# Patient Record
Sex: FEMALE | Race: WHITE | NOT HISPANIC OR LATINO | Employment: UNEMPLOYED | URBAN - METROPOLITAN AREA
[De-identification: names, ages, dates, MRNs, and addresses within clinical notes are randomized per-mention and may not be internally consistent; named-entity substitution may affect disease eponyms.]

---

## 2018-09-07 ENCOUNTER — HOSPITAL ENCOUNTER (INPATIENT)
Facility: CLINIC | Age: 33
LOS: 2 days | Discharge: LEFT BEFORE DISCHARGE | End: 2018-09-10
Attending: FAMILY MEDICINE | Admitting: PSYCHIATRY & NEUROLOGY
Payer: MEDICAID

## 2018-09-07 DIAGNOSIS — F19.10 POLYSUBSTANCE ABUSE (H): ICD-10-CM

## 2018-09-07 DIAGNOSIS — F11.20 HEROIN DEPENDENCE (H): ICD-10-CM

## 2018-09-07 LAB
AMPHETAMINES UR QL SCN: POSITIVE
BARBITURATES UR QL: NEGATIVE
BENZODIAZ UR QL: POSITIVE
CANNABINOIDS UR QL SCN: NEGATIVE
COCAINE UR QL: NEGATIVE
ETHANOL UR QL SCN: NEGATIVE
HCG UR QL: NEGATIVE
INTERNAL QC OK POCT: YES
OPIATES UR QL SCN: POSITIVE

## 2018-09-07 PROCEDURE — 99284 EMERGENCY DEPT VISIT MOD MDM: CPT | Mod: Z6 | Performed by: FAMILY MEDICINE

## 2018-09-07 PROCEDURE — 99285 EMERGENCY DEPT VISIT HI MDM: CPT | Performed by: FAMILY MEDICINE

## 2018-09-07 PROCEDURE — 80320 DRUG SCREEN QUANTALCOHOLS: CPT | Performed by: FAMILY MEDICINE

## 2018-09-07 PROCEDURE — 81025 URINE PREGNANCY TEST: CPT | Performed by: FAMILY MEDICINE

## 2018-09-07 PROCEDURE — 80307 DRUG TEST PRSMV CHEM ANLYZR: CPT | Performed by: FAMILY MEDICINE

## 2018-09-07 ASSESSMENT — ENCOUNTER SYMPTOMS
DYSURIA: 0
WEAKNESS: 0
COUGH: 0
FEVER: 0
NUMBNESS: 0
HEADACHES: 0
ABDOMINAL PAIN: 0
HEMATURIA: 0
FREQUENCY: 0
CHILLS: 0
SHORTNESS OF BREATH: 0
NERVOUS/ANXIOUS: 1

## 2018-09-07 NOTE — IP AVS SNAPSHOT
MRN:[id number]                      After Visit Summary   9/7/2018    Alexandrea Linton    MRN: [id number]           Patient Information     Date Of Birth          1985        Designated Caregiver       Most Recent Value    Caregiver    Will someone help with your care after discharge? no      About your hospital stay     You were admitted on:  September 8, 2018 You last received care in the:  [facility]    You were discharged on:  September 10, 2018       Who to Call     For medical emergencies, please call 911.  For non-urgent questions about your medical care, please call your primary care provider or clinic, None          Attending Provider     Provider Specialty    Hussain Alvares MD Emergency Medicine    Anuradha Mar MD Psychiatry       Primary Care Provider Fax #    Physician No Ref-Primary [phone]      Further instructions from your care team       Behavioral Discharge Planning and Instructions  THANK YOU FOR CHOOSING THE [address]  [phone]    Summary: You were admitted to Station 3A on 9/7/2018 for detoxification from opiates and benzodiazepines.  A medical exam was performed that included lab work. You have met with a  and opted to discharge against medical advice.  Please take care and make your recovery a daily priority, Alexandrea! It was a pleasure working with you and the entire treatment team on 3A wishes you the very best in your recovery!     Main Diagnoses:  Per Dr. Amaro;  1.  Opiate use disorder, severe.   2.  Stimulant use disorder, amphetamine-type substance, moderate.   3.  Sedative hypnotic use disorder, moderate.   4.  Unspecified anxiety disorder.   5.  History of major depressive disorder.     Major Treatments, Procedures and Findings:  You were treated for opiate and benzodiazepine detoxification using the appropriate protocol(s). You had labs drawn and those results were  reviewed with you. Please take a copy of your lab work with you to your next primary care physician appointment.    Symptoms to Report:  If you experience more anxiety, confusion, sleeplessness, deep sadness or thoughts of suicide, notify your treatment team or notify your primary care physician. IF ANY OF THE SYMPTOMS YOU ARE EXPERIENCING ARE A MEDICAL EMERGENCY CALL 911 IMMEDIATELY.     Lifestyle Adjustment: Adjust your lifestyle to get enough sleep, relaxation, exercise and good nutrition. Continue to develop healthy coping skills to decrease stress and promote a sober living environment. Do not use mood altering substances including alcohol, illegal drugs or addictive medications other than what is currently prescribed.     Disposition: Home or Self Care    Follow-up Appointment:   ~ Please make a follow-up appointment with primary care provider within 1 week of discharge ~    Resources:   AA/JOSE C and Sponsors are excellent resources for support and you can find one at any support group meeting.   SMART Recovery - self management for addiction recovery:  www.smartrecovery.org  http://www.aastpaul.org/?topic=8  http://a[place].org/meetings/  http://www.nam[place].org/index.php/meeting-list-pdf  Pathways ~ A Health Crisis Resource & Support Center:  112.337.4668.  http://www.harmreduction.org  [facility] 491-301-0359  Support Group:  AA/JOSE C and Sponsor/support.  National [place] on Mental Illness (www.[place].org): 379.303.8868 or 454-067-2066.  Alcoholics Anonymous (www.alcoholics-anonymous.org): Check your phone book for your local chapter.  Suicide Awareness Voices of Education (SAVE) (www.save.org): 022-076-IEJI (0283)  National Suicide Prevention Line (www.mentalhealthmn.org): 641-324-SXVW (9668)  Mental Health Consumer/Survivor Network of [place] (www.mhcsn.net): 557.309.7405 or 224-290-9392  Mental Health Association of [place] (www.mentalhealth.org): 227.494.8595 or 338-780-0512   Substance Abuse  and Mental Health Services (www.[facility].gov)    [facility] ([facility])  [facility] connects people seeking recovery to resources that help foster and sustain long-term recovery.  Whether you are seeking resources for treatment, transportation, housing, job training, education, health care or other pathways to recovery, [facility] is a great place to start.  670.503.4857.  www.[facility].org    General Medication Instructions:   See your medication sheet(s) for instructions.   Take all medications as prescribed.  Make no changes unless your doctor suggests them.   Go to all your doctor visits.  Be sure to have all your required lab tests. This way, your medicines can be refilled on time.  Do not use any forms of alcohol.    Please Note:  If you have any questions at anytime after you are discharged please call the [facility] [place] detox unit 3AW unit at [phone].  [facility] Behavioral Intake [phone]  Medical Records call [phone]  Outpatient Behavioral Intake call [phone]  LP+ Wait List/Bed Availability call [phone]    Please take this discharge folder with you to all your follow up appointments, it contains your lab results, diagnosis, medication list and discharge recommendations.      THANK YOU FOR CHOOSING THE [facility]     Pending Results     No orders found from 9/5/2018 to 9/8/2018.            Statement of Approval     Ordered          09/10/18 1034  I have reviewed and agree with all the recommendations and orders detailed in this document.  EFFECTIVE NOW     Approved and electronically signed by:  Anuradha Mar MD             Admission Information     Date & Time Provider Department Dept. Phone    9/7/2018 Anuradha Mar MD Young Adult [facility]patient Mental Health [phone]      Your Vitals Were     Blood Pressure Pulse Temperature Respirations Height Weight    120/82 (BP Location: Right arm) 92  "97.2  F (36.2  C) (Oral) 16 1.613 m (5' 3.5\") 77.1 kg (170 lb)    Pulse Oximetry BMI (Body Mass Index)                98% 29.64 kg/m2          [employer] Information     [employer] lets you send messages to your doctor, view your test results, renew your prescriptions, schedule appointments and more. To sign up, go to [email or web address] . Click on \"Log in\" on the left side of the screen, which will take you to the Welcome page. Then click on \"Sign up Now\" on the right side of the page.     You will be asked to enter the access code listed below, as well as some personal information. Please follow the directions to create your username and password.     Your access code is: [email or web address]  Expires: 2018 10:51 AM     Your access code will  in 90 days. If you need help or a new code, please call your [place] clinic or [phone].        Care EveryWhere ID     This is your Care EveryWhere ID. This could be used by other organizations to access your [place] medical records  [id number]        Equal Access to Services     MAR MICHEL AH: Nathanael Rogers, damir ribeiro, joann pandey, abdon anthony. So [facility] [phone].    ATENCIÓN: Si habla español, tiene a reynolds disposición servicios gratuitos de asistencia lingüística. Kelvin al [phone].    We comply with applicable federal civil rights laws and [place] laws. We do not discriminate on the basis of race, color, national origin, age, disability, sex, sexual orientation, or gender identity.               Review of your medicines      START taking        Dose / Directions    PHENobarbital 32.4 MG Tabs tablet   Commonly known as:  LUMINAL   Used for:  Polysubstance abuse        Dose:  32.4 mg   Take 1 tablet (32.4 mg) by mouth At Bedtime On  take 2 tabs at bedtime x1day  On  take 1 tabs at bedtime x1day   Quantity:  3 tablet   Refills:  0         CONTINUE these medicines which have NOT CHANGED  "       Dose / Directions    NO ACTIVE MEDICATIONS        Refills:  0            Where to get your medicines      Some of these will need a paper prescription and others can be bought over the counter. Ask your nurse if you have questions.     Bring a paper prescription for each of these medications     PHENobarbital 32.4 MG Tabs tablet                Protect others around you: Learn how to safely use, store and throw away your medicines at www.disposemymeds.org.             Medication List: This is a list of all your medications and when to take them. Check marks below indicate your daily home schedule. Keep this list as a reference.      Medications           Morning Afternoon Evening Bedtime As Needed    NO ACTIVE MEDICATIONS                                PHENobarbital 32.4 MG Tabs tablet   Commonly known as:  LUMINAL   Take 1 tablet (32.4 mg) by mouth At Bedtime On 9/11 take 2 tabs at bedtime x1day  On 9/12 take 1 tabs at bedtime x1day   Last time this was given:  32.4 mg on 9/10/2018  7:55 AM

## 2018-09-07 NOTE — IP AVS SNAPSHOT
Young Adult [facility] Mental [facility]    [facility] [address]AW    [address] [address]    Phone:  [phone]                                       After Visit Summary   9/7/2018    Alexandrea Linton    MRN: [id number]           After Visit Summary Signature Page     I have received my discharge instructions, and my questions have been answered. I have discussed any challenges I see with this plan with the nurse or doctor.    ..........................................................................................................................................  Patient/Patient Representative Signature      ..........................................................................................................................................  Patient Representative Print Name and Relationship to Patient    ..................................................               ................................................  Date                                            Time    ..........................................................................................................................................  Reviewed by Signature/Title    ...................................................              ..............................................  Date                                                            Time          22EPIC Rev 08/18

## 2018-09-08 PROBLEM — F11.20 HEROIN DEPENDENCE (H): Status: ACTIVE | Noted: 2018-09-08

## 2018-09-08 LAB
ALBUMIN SERPL-MCNC: 2.9 G/DL (ref 3.4–5)
ALP SERPL-CCNC: 95 U/L (ref 40–150)
ALT SERPL W P-5'-P-CCNC: 28 U/L (ref 0–50)
ANION GAP SERPL CALCULATED.3IONS-SCNC: 6 MMOL/L (ref 3–14)
AST SERPL W P-5'-P-CCNC: 15 U/L (ref 0–45)
BILIRUB SERPL-MCNC: 0.3 MG/DL (ref 0.2–1.3)
BUN SERPL-MCNC: 9 MG/DL (ref 7–30)
CALCIUM SERPL-MCNC: 8.4 MG/DL (ref 8.5–10.1)
CHLORIDE SERPL-SCNC: 108 MMOL/L (ref 94–109)
CO2 SERPL-SCNC: 27 MMOL/L (ref 20–32)
CREAT SERPL-MCNC: 0.89 MG/DL (ref 0.52–1.04)
ERYTHROCYTE [DISTWIDTH] IN BLOOD BY AUTOMATED COUNT: 12.8 % (ref 10–15)
GFR SERPL CREATININE-BSD FRML MDRD: 73 ML/MIN/1.7M2
GLUCOSE SERPL-MCNC: 97 MG/DL (ref 70–99)
HCT VFR BLD AUTO: 35.2 % (ref 35–47)
HGB BLD-MCNC: 11.6 G/DL (ref 11.7–15.7)
MCH RBC QN AUTO: 28.2 PG (ref 26.5–33)
MCHC RBC AUTO-ENTMCNC: 33 G/DL (ref 31.5–36.5)
MCV RBC AUTO: 85 FL (ref 78–100)
PLATELET # BLD AUTO: 230 10E9/L (ref 150–450)
POTASSIUM SERPL-SCNC: 4.2 MMOL/L (ref 3.4–5.3)
PROT SERPL-MCNC: 6.5 G/DL (ref 6.8–8.8)
RBC # BLD AUTO: 4.12 10E12/L (ref 3.8–5.2)
SODIUM SERPL-SCNC: 141 MMOL/L (ref 133–144)
TSH SERPL DL<=0.005 MIU/L-ACNC: 1.93 MU/L (ref 0.4–4)
WBC # BLD AUTO: 4.6 10E9/L (ref 4–11)

## 2018-09-08 PROCEDURE — 99221 1ST HOSP IP/OBS SF/LOW 40: CPT | Performed by: PHYSICIAN ASSISTANT

## 2018-09-08 PROCEDURE — HZ2ZZZZ DETOXIFICATION SERVICES FOR SUBSTANCE ABUSE TREATMENT: ICD-10-PCS | Performed by: PSYCHIATRY & NEUROLOGY

## 2018-09-08 PROCEDURE — 84443 ASSAY THYROID STIM HORMONE: CPT | Performed by: PHYSICIAN ASSISTANT

## 2018-09-08 PROCEDURE — 12800013 ZZH R&B CD INTERMEDIATE ADOLESCENT

## 2018-09-08 PROCEDURE — 99207 ZZC CONSULT E&M CHANGED TO INITIAL LEVEL: CPT | Performed by: PHYSICIAN ASSISTANT

## 2018-09-08 PROCEDURE — 25000125 ZZHC RX 250: Performed by: PHYSICIAN ASSISTANT

## 2018-09-08 PROCEDURE — 25000132 ZZH RX MED GY IP 250 OP 250 PS 637: Performed by: PHYSICIAN ASSISTANT

## 2018-09-08 PROCEDURE — 85027 COMPLETE CBC AUTOMATED: CPT | Performed by: PHYSICIAN ASSISTANT

## 2018-09-08 PROCEDURE — 99223 1ST HOSP IP/OBS HIGH 75: CPT | Mod: AI | Performed by: PSYCHIATRY & NEUROLOGY

## 2018-09-08 PROCEDURE — 25000132 ZZH RX MED GY IP 250 OP 250 PS 637: Performed by: FAMILY MEDICINE

## 2018-09-08 PROCEDURE — 80053 COMPREHEN METABOLIC PANEL: CPT | Performed by: PHYSICIAN ASSISTANT

## 2018-09-08 PROCEDURE — 36415 COLL VENOUS BLD VENIPUNCTURE: CPT | Performed by: PHYSICIAN ASSISTANT

## 2018-09-08 PROCEDURE — 25000132 ZZH RX MED GY IP 250 OP 250 PS 637: Performed by: PSYCHIATRY & NEUROLOGY

## 2018-09-08 RX ORDER — ACETAMINOPHEN 325 MG/1
650 TABLET ORAL EVERY 4 HOURS PRN
Status: DISCONTINUED | OUTPATIENT
Start: 2018-09-08 | End: 2018-09-10 | Stop reason: HOSPADM

## 2018-09-08 RX ORDER — NALOXONE HYDROCHLORIDE 0.4 MG/ML
.1-.4 INJECTION, SOLUTION INTRAMUSCULAR; INTRAVENOUS; SUBCUTANEOUS
Status: DISCONTINUED | OUTPATIENT
Start: 2018-09-08 | End: 2018-09-10 | Stop reason: HOSPADM

## 2018-09-08 RX ORDER — NICOTINE 21 MG/24HR
1 PATCH, TRANSDERMAL 24 HOURS TRANSDERMAL DAILY
Status: DISCONTINUED | OUTPATIENT
Start: 2018-09-08 | End: 2018-09-10 | Stop reason: HOSPADM

## 2018-09-08 RX ORDER — LIDOCAINE 4 G/G
1 PATCH TOPICAL
Status: DISCONTINUED | OUTPATIENT
Start: 2018-09-08 | End: 2018-09-10 | Stop reason: HOSPADM

## 2018-09-08 RX ORDER — BUPRENORPHINE 2 MG/1
2 TABLET SUBLINGUAL 4 TIMES DAILY
Status: DISCONTINUED | OUTPATIENT
Start: 2018-09-08 | End: 2018-09-10

## 2018-09-08 RX ORDER — CYCLOBENZAPRINE HCL 10 MG
10 TABLET ORAL 3 TIMES DAILY PRN
Status: DISCONTINUED | OUTPATIENT
Start: 2018-09-08 | End: 2018-09-10 | Stop reason: HOSPADM

## 2018-09-08 RX ORDER — HYDROXYZINE HYDROCHLORIDE 25 MG/1
25 TABLET, FILM COATED ORAL EVERY 4 HOURS PRN
Status: DISCONTINUED | OUTPATIENT
Start: 2018-09-08 | End: 2018-09-08

## 2018-09-08 RX ORDER — IBUPROFEN 600 MG/1
600 TABLET, FILM COATED ORAL EVERY 6 HOURS PRN
Status: DISCONTINUED | OUTPATIENT
Start: 2018-09-08 | End: 2018-09-10 | Stop reason: HOSPADM

## 2018-09-08 RX ORDER — GINSENG 100 MG
CAPSULE ORAL 2 TIMES DAILY
Status: DISCONTINUED | OUTPATIENT
Start: 2018-09-08 | End: 2018-09-10 | Stop reason: HOSPADM

## 2018-09-08 RX ORDER — PHENOBARBITAL 32.4 MG/1
32.4 TABLET ORAL 3 TIMES DAILY
Status: DISCONTINUED | OUTPATIENT
Start: 2018-09-08 | End: 2018-09-08

## 2018-09-08 RX ORDER — PHENOBARBITAL 32.4 MG/1
32.4 TABLET ORAL 3 TIMES DAILY
Status: DISCONTINUED | OUTPATIENT
Start: 2018-09-08 | End: 2018-09-10

## 2018-09-08 RX ORDER — ONDANSETRON 4 MG/1
4 TABLET, FILM COATED ORAL EVERY 6 HOURS PRN
Status: DISCONTINUED | OUTPATIENT
Start: 2018-09-08 | End: 2018-09-10 | Stop reason: HOSPADM

## 2018-09-08 RX ORDER — HYDROXYZINE HYDROCHLORIDE 25 MG/1
25-50 TABLET, FILM COATED ORAL EVERY 4 HOURS PRN
Status: DISCONTINUED | OUTPATIENT
Start: 2018-09-08 | End: 2018-09-10 | Stop reason: HOSPADM

## 2018-09-08 RX ORDER — TRAZODONE HYDROCHLORIDE 50 MG/1
50 TABLET, FILM COATED ORAL
Status: DISCONTINUED | OUTPATIENT
Start: 2018-09-08 | End: 2018-09-10 | Stop reason: HOSPADM

## 2018-09-08 RX ORDER — BUPRENORPHINE 2 MG/1
4 TABLET SUBLINGUAL ONCE
Status: COMPLETED | OUTPATIENT
Start: 2018-09-08 | End: 2018-09-08

## 2018-09-08 RX ADMIN — PHENOBARBITAL 32.4 MG: 32.4 TABLET ORAL at 02:51

## 2018-09-08 RX ADMIN — IBUPROFEN 600 MG: 600 TABLET, FILM COATED ORAL at 20:40

## 2018-09-08 RX ADMIN — BACITRACIN: 500 OINTMENT TOPICAL at 21:12

## 2018-09-08 RX ADMIN — BUPRENORPHINE HCL 2 MG: 2 TABLET SUBLINGUAL at 20:39

## 2018-09-08 RX ADMIN — BUPRENORPHINE HCL 4 MG: 2 TABLET SUBLINGUAL at 22:13

## 2018-09-08 RX ADMIN — PHENOBARBITAL 32.4 MG: 32.4 TABLET ORAL at 09:02

## 2018-09-08 RX ADMIN — HYDROXYZINE HYDROCHLORIDE 25 MG: 25 TABLET ORAL at 09:02

## 2018-09-08 RX ADMIN — IBUPROFEN 600 MG: 600 TABLET, FILM COATED ORAL at 02:51

## 2018-09-08 RX ADMIN — NICOTINE 1 PATCH: 14 PATCH, EXTENDED RELEASE TRANSDERMAL at 10:53

## 2018-09-08 RX ADMIN — LIDOCAINE 1 PATCH: 560 PATCH PERCUTANEOUS; TOPICAL; TRANSDERMAL at 10:54

## 2018-09-08 RX ADMIN — BACITRACIN: 500 OINTMENT TOPICAL at 10:53

## 2018-09-08 RX ADMIN — IBUPROFEN 600 MG: 600 TABLET, FILM COATED ORAL at 09:02

## 2018-09-08 RX ADMIN — HYDROXYZINE HYDROCHLORIDE 50 MG: 25 TABLET ORAL at 23:16

## 2018-09-08 RX ADMIN — CYCLOBENZAPRINE HYDROCHLORIDE 10 MG: 10 TABLET, FILM COATED ORAL at 23:16

## 2018-09-08 RX ADMIN — CYCLOBENZAPRINE HYDROCHLORIDE 10 MG: 10 TABLET, FILM COATED ORAL at 14:00

## 2018-09-08 RX ADMIN — PHENOBARBITAL 32.4 MG: 32.4 TABLET ORAL at 14:00

## 2018-09-08 RX ADMIN — BUPRENORPHINE HCL 2 MG: 2 TABLET SUBLINGUAL at 17:03

## 2018-09-08 RX ADMIN — PHENOBARBITAL 32.4 MG: 32.4 TABLET ORAL at 20:40

## 2018-09-08 ASSESSMENT — ACTIVITIES OF DAILY LIVING (ADL)
GROOMING: INDEPENDENT
ORAL_HYGIENE: INDEPENDENT
LAUNDRY: WITH SUPERVISION
DRESS: INDEPENDENT
LAUNDRY: WITH SUPERVISION
GROOMING: INDEPENDENT
ORAL_HYGIENE: INDEPENDENT
DRESS: INDEPENDENT

## 2018-09-08 NOTE — PROGRESS NOTES
09/08/18 0418   Patient Belongings   Did you bring any home meds/supplements to the hospital?  Yes   Disposition of meds  Sent to security/pharmacy per site process   Patient Belongings purse;cell phone/electronics;wallet   Disposition of Belongings Other (see comment);Kept with patient;Locker;Sent to security per site process   Belongings Search Yes   Clothing Search Yes   Second Staff Keke BAJWA           Stored in Bin- Samsu cell phone, [employer] Tablet, Red purse, white underwear and bra, zip hoodie w/ strings, black shoes w/ laces, flip flops, 2x coloring books, word find book, mirror, spray on deodorant, 4x tampons, hairbrush, , cleaning wipes, 4 large hair bands, coloring binder w/ notepad and coloring sheets, approx 70 markers in black marker bag, make up bag, lipstick, blush, Origel, lip balm, gloss, mascara, lip plumping, charging cord, wipes, cleansing wipes, unicorn make up pallet, 2x chap stick, 4x lighters, headphones (ear buds), keys, perfume, tweezers, tiny gold spoon, pen, josé miguel cream, silver colored necklace, no cash, assorted coupons, silver colored wallet, NO CASH, 31 cents.  On Person- Black leggings, hoodie with string removed, 2x tank tops, underwear.   Sent to security contraband- Foldable knife, 2x sharp dental pick, small screwdriver, small cuticle cutter, multitool w/ sharp edges, metal ellen/monical w/ unkown substance.  Sent to security valuables, envelope # [id number]- [place], [place] bank debit card ---- [phone], [employer] ID, [place] EBT card ----[phone], [employer] card [phone], [employer] secret pink card ---- [phone]  Sent to pharmacy envelope # [id number]- Aleve 4x pill.           A               Admission:  I am responsible for any personal items that are not sent to the safe or pharmacy.  [facility] is not responsible for loss, theft or damage of any property in my possession.    Signature:  _________________________________ Date: _______  Time: _____                                               Staff Signature:  ____________________________ Date: ________  Time: _____      2nd Staff person, if patient is unable/unwilling to sign:    Signature: ________________________________ Date: ________  Time: _____     Discharge:  [facility] has returned all of my personal belongings:    Signature: _________________________________ Date: ________  Time: _____                                          Staff Signature:  ____________________________ Date: ________  Time: _____

## 2018-09-08 NOTE — H&P
"Admitted:     09/07/2018      IDENTIFYING INFORMATION:  The patient is a 32-year-old female who is currently unemployed and resides with her fiance.      CHIEF COMPLAINT:  \"Things haven't been going well.\"      HISTORY OF PRESENT ILLNESS:  The patient has a history of opiate dependence, who presented voluntarily to the Emergency Room seeking detox.  Her urine drug screen was positive for amphetamines, opiates and benzodiazepines with her urine drug screen negative on examination today.  She recalls her last admission to our detox in 2009, after which she transitioned to residential treatment and Suboxone maintenance.  She mostly maintained sobriety for the subsequent 4 years with the exception of a brief relapse approximately 1 year ago.  She relapsed on opiates and has progressively increased her use over the past 1 year.  She estimates using nearly half a gram of heroin, which she injects intravenously on a daily basis.  She also reports intermittent usage of methamphetamine, estimating 1 to 2 times a week and alcohol approximately once a week.  She has also been using benzodiazepines on a daily basis, estimating either 2 mg of Xanax or Klonopin daily.  Additional complicating factors involve chronic back pain and severe anxiety, and she reports gaining relief of these symptoms by using these substances.  Her ongoing usage has resulted in some strain in her relationship with her fiance and loss of her job a few months ago.  Today, she is hopeful to detox from substances and regain sobriety to further improve her quality of life and comfort.      PSYCHIATRIC REVIEW OF SYSTEMS:  She endorsed depressed mood in the setting of substance usage and negative consequences.  She denied feeling helpless or hopeless.  She denied suicidal and homicidal thoughts.  She denied anhedonia.  Energy, appetite and concentration have been variable in the setting of substance usage.  No clear symptoms of graciela or psychosis reported.  " "She describes severe anxiety to the point of frequent panic attacks and some hesitation to leave her home out of fear of having another panic attack.  Despite reporting these symptoms, she has been able to maintain employment and engage in other activities outside the home.  No symptoms corresponding to SAMIA.  No symptoms corresponding to PTSD, eating disorder or OCD.      PAST PSYCHIATRIC HISTORY:  The patient recalls seeking treatment of anxiety on an outpatient basis.  She has been intermittently prescribed benzodiazepines by various providers; however, she recently lost her health insurance and has been acquiring these substances on her own.  She recalls 2 prior suicide attempts, identifying her first attempt many years ago in the context of an argument with her mother whom she states told her \"Why don't you go kill yourself?\"  In the midst of that argument, she had ingested a handful of pills resulting in evaluation in the Emergency Room and a 72-hour stay at an outside behavioral health unit.  No other inpatient hospitalizations reported.  Her second suicide attempt was approximately 9 months ago, at which point she overdosed on heroin with intent to die.  She woke up the following day and did not seek help afterwards.  She currently does not have any outpatient mental health providers.      SUBSTANCE ABUSE HISTORY:  Drug of choice is heroin with pattern of usage corresponding to dependence, identifying progressive use, loss of control over usage, tolerance, withdrawal, negative consequences including strained relationships and loss of employment, and mood instability.  She has also been regularly using benzodiazepines, estimating 2 mg of Xanax or Klonopin daily, and further notices withdrawal when she does not have the substance.  Symptoms of withdrawal are described as worsening and severe anxiety and shakes.  No history of seizures or DTs.  Alcohol usage is sporadic with no reports of tolerance or " withdrawal.  She has been to detox and treatment in the past.      PAST MEDICAL HISTORY:  Refer to the Internal Medicine consultation.      FAMILY HISTORY:  Positive for depression, anxiety and chemical addiction.  No knowledge of suicides in the family.      SOCIAL HISTORY:  Refer to the psychosocial assessment completed by our .  The patient is currently unemployed and resides with her fiance.  She reports that they have a good relationship and he is supportive of her sobriety.  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      MEDICAL REVIEW OF SYSTEMS:  Ten-point systems were reviewed and were positive for psychiatric symptoms as noted above, otherwise negative.      PHYSICAL EXAMINATION:  Blood pressure 108/74, respirations 16, heart rate 75, temperature 97.1.  Weight is 170 pounds.  The rest of the physical examination was reviewed in the Emergency Room documentation.        MENTAL STATUS EXAMINATION:  The patient appears her stated age, appropriately dressed, somewhat disheveled, unkempt, lying in bed.  She was crying, however, able to maintain composure.  No psychomotor abnormalities.  Speech was normal, not pushed or pressured.  Mood was described as being sad and anxious.  Affect was congruent.  Thought process was linear and logical.  Thought content did not display evidence of psychosis.  She denied suicidal and homicidal thoughts.  Insight and judgment appear fair.  Cognition appeared intact to interviewing including orientation to person, place, time and situation, use of language, fund of knowledge, recent and remote memory.  Muscle strength, tone and gait appear normal on visual inspection.      ASSESSMENT:   1.  Opiate use disorder, severe.   2.  Stimulant use disorder, amphetamine-type  substance, moderate.   3.  Sedative hypnotic use disorder, moderate.   4.  Unspecified anxiety disorder.   5.  History of major depressive disorder.      PLAN:   1.  The patient has been admitted to station 4A to detox from opiates and benzodiazepines.  Treatment will be continued voluntarily and her primary team will resume her care on Monday morning.   2.  Buprenorphine will be started today to minimize opiate withdrawal symptoms.  Phenobarbital has also been started for managing potential benzodiazepine withdrawal.  As she progresses through detox, her mood and anxiety symptoms can be reevaluated to determine if she would benefit from psychotropic medications to better address these symptoms.   3.  Internal Medicine consultation to address acute/chronic medical conditions.   4.  Psychosocial treatments to be addressed with social work consult and groups.  The patient is interested in exploring additional chemical addiction treatment options after she completes detoxification.  She would also benefit from a referral for individual psychotherapy; however, funding and insurance may be a barrier.  She will work with our social worked on available options.         LAMONT EMERY MD             D: 2018   T: 2018   MT: LUCRETIA      Name:     ALEXSANDRA CALLAWAY   MRN:      [id number]        Account:      [id number]   :      1985        Admitted:     2018                   Document: [id number]

## 2018-09-08 NOTE — ED NOTES
ED to Behavioral Floor Handoff    SITUATION  Alexandrea Linton is a 32 year old female who speaks English and lives in a home with others The patient arrived in the ED by private car from home with a complaint of Addiction Problem (Detox from IV heroin, last use 6 hours ago, using meth and benzos, has a bed on hold on 3A.)  .The patient's current symptoms started/worsened 1 year(s) ago and during this time the symptoms have increased.   In the ED, pt was diagnosed with   Final diagnoses:   Heroin dependence (H)   Polysubstance abuse        Initial vitals were: BP: 118/52  Pulse: 87  Heart Rate: 87  Temp: 97.7  F (36.5  C)  Resp: 16  Weight: 77.1 kg (170 lb)  SpO2: 95 %   --------  Is the patient diabetic? No   If yes, last blood glucose? --     If yes, was this treated in the ED? --  --------  Is the patient inebriated (ETOH) No or Impaired on other substances? No  MSSA done? N/A  Last MSSA score: --    Were withdrawal symptoms treated? N/A  Does the patient have a seizure history? No. If yes, date of most recent seizure--  --------  Is the patient patient experiencing suicidal ideation? denies current or recent suicidal ideation     Homicidal ideation? denies current or recent homicidal ideation or behaviors.    Self-injurious behavior/urges? denies current or recent self injurious behavior or ideation.  ------  Was pt aggressive in the ED No  Was a code called No  Is the pt now cooperative? Yes  -------  Meds given in ED: Medications - No data to display   Family present during ED course? Yes  Family currently present? Yes    BACKGROUND  Does the patient have a cognitive impairment or developmental disability? No  Allergies:   Allergies   Allergen Reactions     Latex      Rash   .   Social demographics are   Social History     Social History     Marital status:      Spouse name: N/A     Number of children: 0     Years of education: 14     Social History Main Topics     Smoking status: Current Every Day  Smoker     Smokeless tobacco: None      Comment: 1/4 PPD     Alcohol use 0.0 oz/week     0 Standard drinks or equivalent per week      Comment: few times per month     Drug use: No     Sexual activity: No     Other Topics Concern     Caffeine Concern Yes     3 cans daily     Exercise Yes     2/week     Seat Belt Yes     Social History Narrative        ASSESSMENT  Labs results Labs Ordered and Resulted from Time of ED Arrival Up to the Time of Departure from the ED - No data to display   Imaging Studies: No results found for this or any previous visit (from the past 24 hour(s)).   Most recent vital signs /52  Pulse 87  Temp 97.7  F (36.5  C) (Oral)  Resp 16  Wt 77.1 kg (170 lb)  SpO2 95%   Abnormal labs/tests/findings requiring intervention:---   Pain control: pt had none  Nausea control: pt had none    RECOMMENDATION  Are any infection precautions needed (MRSA, VRE, etc.)? No If yes, what infection? --  ---  Does the patient have mobility issues? independently. If yes, what device does the pt use? ---  ---  Is patient on 72 hour hold or commitment? No If on 72 hour hold, have hold and rights been given to patient? N/A  Are admitting orders written if after 10 p.m. ?No  Tasks needing to be completed:---     Seema Arriaza     [phone] [place]   [phone] [facility]
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
## 2018-09-08 NOTE — ED PROVIDER NOTES
[facility] EMERGENCY DEPARTMENT ([facility])    9/07/18     Room 16 B   9:18 PM   History     Chief Complaint   Patient presents with     Addiction Problem     Detox from IV heroin, last use 6 hours ago, using meth and benzos, has a bed on hold on 3A.     The history is provided by the patient and medical records (forthcoming historian).     Alexandrea Linton is a 32 year old female who presents seeking opiate detox. She has a history of chronic back pain, polysubstance abuse. Her drug of choice has been 0.25-0.5 g heroin IV a day, last used this morning. She is not withdrawing yet. She also uses meth, alcohol and benzodiazepines, essentially whatever is available. She has been here for detox in the past, last in 2009. She relapsed abut a year ago, was sober for 4-5 years prior to that relapse. No headaches, fevers, chills, cough or shortness of breath. She does endorse chest pain but notes she has a history of anxiety. No abdominal pain. Her last menstrual period was 2 weeks late and was painful and heavy. There is a chance that she is pregnant.  No urinary symptoms. No numbness, weakness, tingling. Physically she has no complaints.     I have reviewed the Medications, Allergies, Past Medical and Surgical History, and Social History in the [facility] system.  PAST MEDICAL HISTORY:   Past Medical History:   Diagnosis Date     CARDIOVASCULAR SCREENING; LDL GOAL LESS THAN 160      Substance abuse        PAST SURGICAL HISTORY:   Past Surgical History:   Procedure Laterality Date     NO HISTORY OF SURGERY         FAMILY HISTORY:   Family History   Problem Relation Age of Onset     Family History Negative         SOCIAL HISTORY:   Social History   Substance Use Topics     Smoking status: Current Every Day Smoker     Smokeless tobacco: Not on file      Comment: 1/4 PPD     Alcohol use 0.0 oz/week     0 Standard drinks or equivalent per week      Comment: few times per month       Patient's Medications   New  Prescriptions    No medications on file   Previous Medications    NO ACTIVE MEDICATIONS       Modified Medications    No medications on file   Discontinued Medications    No medications on file          Allergies   Allergen Reactions     Latex      Rash        Review of Systems   Constitutional: Negative for chills and fever.   Respiratory: Negative for cough and shortness of breath.    Cardiovascular: Negative for chest pain.   Gastrointestinal: Negative for abdominal pain.   Genitourinary: Negative for dysuria, frequency, hematuria and urgency.   Neurological: Negative for weakness, numbness and headaches.   Psychiatric/Behavioral: The patient is nervous/anxious.        Physical Exam   BP: 118/52  Pulse: 87  Heart Rate: 87  Temp: 97.7  F (36.5  C)  Resp: 16  Weight: 77.1 kg (170 lb)  SpO2: 95 %      Physical Exam   Constitutional: She is oriented to person, place, and time. No distress.   HENT:   Head: Atraumatic.   Mouth/Throat: Oropharynx is clear and moist. No oropharyngeal exudate.   Eyes: Pupils are equal, round, and reactive to light. No scleral icterus.   Cardiovascular: Normal heart sounds and intact distal pulses.    Pulmonary/Chest: Breath sounds normal. No respiratory distress.   Abdominal: Soft. Bowel sounds are normal. There is no tenderness.   Musculoskeletal: She exhibits no edema or tenderness.   Neurological: She is alert and oriented to person, place, and time. No cranial nerve deficit. She exhibits normal muscle tone. Coordination normal.   Skin: Skin is warm. No rash noted. She is not diaphoretic.   Psychiatric: Her mood appears anxious. She expresses no suicidal ideation.       ED Course     ED Course     Procedures         Critical Care time:  none     Labs/Imaging:    Labs are normal except as shown.   Labs Ordered and Resulted from Time of ED Arrival Up to the Time of Departure from the ED - No data to display      Assessments & Plan (with Medical Decision Making)       I have reviewed the  nursing notes.    I have reviewed the findings, diagnosis, plan and need for follow up with the patient.  With heroin dependence and polysubstance abuse at this time will require medically supervised detox and treatment patient will be admitted to station 3A for further evaluation and treatment.    New Prescriptions    No medications on file       Final diagnoses:   Heroin dependence (H)   Polysubstance abuse     I, Farzana Tapia, am serving as a trained medical scribe to document services personally performed by Hussain Alvares MD based on the provider's statements to me on September 7, 2018.  This document has been checked and approved by the attending provider.    I, Hussain Alvares MD, was physically present and have reviewed and verified the accuracy of this note documented by Farzana Tapia, medical scribe.       9/7/2018   [facility], [place], EMERGENCY DEPARTMENT     Hussain Alvares MD  09/08/18 0024       Hussain Alvares MD  09/24/18 1126

## 2018-09-08 NOTE — PROGRESS NOTES
S: Pt admitted via ED for detox from Opiates and benzodiazapine's.  B:pt has been [id number] heroin daily, last use 9/7/18@1700. Pt has been using 2mg of either xanax or Klonopin daily, last use 9/7/18@ 1700. Pt uses alcohol, 1-2 drinks on avg 3 times weekly. Pt uses methamphetamine occasionally. Pt denies any history of DT's or seizures. Pt has a history of chronic back pain. Pt currently has a broken toe. Pt denies any other acute or chronic medical problems. Pt has psychiatric diagnosis of depression and anxiety. Pt reports 2 past suicide attempts 1 14 years ago and 1 about 6 weeks ago. Both by right eye. Pt denies any current suicidal ideation or intent.  A; pt appears in very mild Opiate withdrawal on admission.  R:Monitor per MSSA and COWS scales. Medication per MD. 15 min checks and withdrawal precautions for safety.

## 2018-09-08 NOTE — ED NOTES
Pt seeking detox for heroin use. States she last used heroin and benzos 4 hours ago. Denies SI or HI.

## 2018-09-09 PROCEDURE — 25000132 ZZH RX MED GY IP 250 OP 250 PS 637: Performed by: PHYSICIAN ASSISTANT

## 2018-09-09 PROCEDURE — 25000132 ZZH RX MED GY IP 250 OP 250 PS 637: Performed by: PSYCHIATRY & NEUROLOGY

## 2018-09-09 PROCEDURE — 12800013 ZZH R&B CD INTERMEDIATE ADOLESCENT

## 2018-09-09 RX ADMIN — PHENOBARBITAL 32.4 MG: 32.4 TABLET ORAL at 08:53

## 2018-09-09 RX ADMIN — BUPRENORPHINE HCL 2 MG: 2 TABLET SUBLINGUAL at 08:53

## 2018-09-09 RX ADMIN — LIDOCAINE 1 PATCH: 560 PATCH PERCUTANEOUS; TOPICAL; TRANSDERMAL at 08:54

## 2018-09-09 RX ADMIN — BUPRENORPHINE HCL 2 MG: 2 TABLET SUBLINGUAL at 20:32

## 2018-09-09 RX ADMIN — IBUPROFEN 600 MG: 600 TABLET, FILM COATED ORAL at 20:32

## 2018-09-09 RX ADMIN — CYCLOBENZAPRINE HYDROCHLORIDE 10 MG: 10 TABLET, FILM COATED ORAL at 11:36

## 2018-09-09 RX ADMIN — HYDROXYZINE HYDROCHLORIDE 25 MG: 25 TABLET ORAL at 11:36

## 2018-09-09 RX ADMIN — HYDROXYZINE HYDROCHLORIDE 50 MG: 25 TABLET ORAL at 20:32

## 2018-09-09 RX ADMIN — PHENOBARBITAL 32.4 MG: 32.4 TABLET ORAL at 13:59

## 2018-09-09 RX ADMIN — BUPRENORPHINE HCL 2 MG: 2 TABLET SUBLINGUAL at 16:39

## 2018-09-09 RX ADMIN — BUPRENORPHINE HCL 2 MG: 2 TABLET SUBLINGUAL at 11:36

## 2018-09-09 RX ADMIN — PHENOBARBITAL 32.4 MG: 32.4 TABLET ORAL at 20:32

## 2018-09-09 RX ADMIN — NICOTINE 1 PATCH: 14 PATCH, EXTENDED RELEASE TRANSDERMAL at 08:53

## 2018-09-09 RX ADMIN — IBUPROFEN 600 MG: 600 TABLET, FILM COATED ORAL at 08:53

## 2018-09-09 RX ADMIN — BACITRACIN: 500 OINTMENT TOPICAL at 08:54

## 2018-09-09 ASSESSMENT — ACTIVITIES OF DAILY LIVING (ADL)
ORAL_HYGIENE: INDEPENDENT
DRESS: INDEPENDENT
GROOMING: INDEPENDENT
LAUNDRY: WITH SUPERVISION

## 2018-09-10 VITALS
SYSTOLIC BLOOD PRESSURE: 120 MMHG | HEIGHT: 64 IN | TEMPERATURE: 97.2 F | RESPIRATION RATE: 16 BRPM | DIASTOLIC BLOOD PRESSURE: 82 MMHG | BODY MASS INDEX: 29.02 KG/M2 | OXYGEN SATURATION: 98 % | HEART RATE: 92 BPM | WEIGHT: 170 LBS

## 2018-09-10 PROCEDURE — 25000132 ZZH RX MED GY IP 250 OP 250 PS 637: Performed by: PSYCHIATRY & NEUROLOGY

## 2018-09-10 PROCEDURE — 99239 HOSP IP/OBS DSCHRG MGMT >30: CPT | Performed by: PSYCHIATRY & NEUROLOGY

## 2018-09-10 PROCEDURE — 25000132 ZZH RX MED GY IP 250 OP 250 PS 637: Performed by: PHYSICIAN ASSISTANT

## 2018-09-10 RX ORDER — BUPRENORPHINE 2 MG/1
2 TABLET SUBLINGUAL 4 TIMES DAILY
Status: DISCONTINUED | OUTPATIENT
Start: 2018-09-10 | End: 2018-09-10 | Stop reason: HOSPADM

## 2018-09-10 RX ORDER — OLANZAPINE 5 MG/1
5 TABLET, ORALLY DISINTEGRATING ORAL ONCE
Status: COMPLETED | OUTPATIENT
Start: 2018-09-10 | End: 2018-09-10

## 2018-09-10 RX ORDER — BUPRENORPHINE 2 MG/1
2 TABLET SUBLINGUAL ONCE
Status: DISCONTINUED | OUTPATIENT
Start: 2018-09-10 | End: 2018-09-10

## 2018-09-10 RX ORDER — PHENOBARBITAL 32.4 MG/1
32.4 TABLET ORAL AT BEDTIME
Qty: 3 TABLET | Refills: 0 | Status: SHIPPED | OUTPATIENT
Start: 2018-09-10 | End: 2022-12-18

## 2018-09-10 RX ORDER — BUPRENORPHINE 2 MG/1
4 TABLET SUBLINGUAL 3 TIMES DAILY
Status: DISCONTINUED | OUTPATIENT
Start: 2018-09-10 | End: 2018-09-10

## 2018-09-10 RX ORDER — PHENOBARBITAL 64.8 MG/1
64.8 TABLET ORAL ONCE
Status: COMPLETED | OUTPATIENT
Start: 2018-09-10 | End: 2018-09-10

## 2018-09-10 RX ADMIN — IBUPROFEN 600 MG: 600 TABLET, FILM COATED ORAL at 07:55

## 2018-09-10 RX ADMIN — LIDOCAINE 1 PATCH: 560 PATCH PERCUTANEOUS; TOPICAL; TRANSDERMAL at 07:56

## 2018-09-10 RX ADMIN — OLANZAPINE 5 MG: 5 TABLET, ORALLY DISINTEGRATING ORAL at 09:09

## 2018-09-10 RX ADMIN — BUPRENORPHINE HCL 2 MG: 2 TABLET SUBLINGUAL at 07:55

## 2018-09-10 RX ADMIN — PHENOBARBITAL 32.4 MG: 32.4 TABLET ORAL at 07:55

## 2018-09-10 RX ADMIN — BACITRACIN: 500 OINTMENT TOPICAL at 07:56

## 2018-09-10 RX ADMIN — HYDROXYZINE HYDROCHLORIDE 50 MG: 25 TABLET ORAL at 07:55

## 2018-09-10 RX ADMIN — PHENOBARBITAL 64.8 MG: 64.8 TABLET ORAL at 11:00

## 2018-09-10 RX ADMIN — NICOTINE 1 PATCH: 14 PATCH, EXTENDED RELEASE TRANSDERMAL at 07:55

## 2018-09-10 ASSESSMENT — ACTIVITIES OF DAILY LIVING (ADL)
GROOMING: INDEPENDENT
LAUNDRY: WITH SUPERVISION
ORAL_HYGIENE: INDEPENDENT
DRESS: INDEPENDENT
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
## 2018-09-10 NOTE — DISCHARGE SUMMARY
Admit Date:     09/07/2018   Discharge Date:     09/10/2018      More than 35 minutes spent on discharge summary, doing the discharge instructions, discharge medications, discharge mental status examination.      DISCHARGE DIAGNOSES:   Axis I:     1.  Opiate use disorder, severe.   2.  Stimulant use disorder, sedative medication use disorder, moderate.   3.  Major depressive disorder with history of unspecified anxiety disorder.      IDENTIFYING INFORMATION:  The patient is a 33-year-old  female admitted for detox.  Please see detailed admission by on 09/08/2018.      HOSPITAL COURSE:  During the hospitalization, the patient was put on Suboxone.  She was put on phenobarbital.  The patient was seen on 09/07.  The patient wants to leave.  She says she does not want to stay.  She is feeling anxious.  She was offered medication.  She was offered to transfer to inpatient Psychiatry for further care.  She did not want to do either one of them.  She is not holdable.  She is not suicidal.  She is not homicidal.  She is not committable.  Ideally, I would like for her to start the taper in the inpatient unit and continueto consider some trt .  She does not want to do so.  She wants to be discharged.  She will be discharged against medical advice.  She was given a referral.  She will be seen by Myla Moran on 09/08/2018.  The patient had lab work done, which was normal comprehensive metabolic panel.  Beta hCG is negative.  During the hospitalization, the patient's energy, motivation, sleep and interest improved, did not have any suicidal or homicidal ideation, plan or intent.      DISCHARGE DISPOSITION:  The patient going home against medical advice.  The patient will have phenobarbital taper ordered for her.  I have called her pharmacy at [facility] on [address] and ordered it.  The patient will be discharged.       Alexandrea Linton   Home Medication Instructions TUYET:[id number]    Printed on:09/11/18 1253   Medication  Information                      NO ACTIVE MEDICATIONS               PHENobarbital (LUMINAL) 32.4 MG TABS tablet  Take 1 tablet (32.4 mg) by mouth At Bedtime On  take 2 tabs at bedtime x1day   On  take 1 tabs at bedtime x1day                  UMU MONTALVO MD             D: 09/10/2018   T: 09/10/2018   MT: [id number]      Name:     ALEXSANDRA CALLAWAY   MRN:      [id number]        Account:        [id number]   :      1985           Admit Date:     2018                                  Discharge Date: 09/10/2018      Document: [id number]

## 2018-09-10 NOTE — PROGRESS NOTES
"Pt presents as highly agitated this am. Pt restless, pacing, tearful. Dr. Mar ordered a one time dose of Zyprexa 5mg once due to agitation. Patient took willingly. Pt requesting discharge and states, \"I don't need to be here.\"  "

## 2018-09-10 NOTE — DISCHARGE SUMMARY
Patient discharged Against Medical Advice at 1110 to home after meeting with Dr. Mar.     All patient belongings from room, locker, and security sent with patient. This RN gave patient information on getting set up with Suboxone Maintenance at [facility]. This RN gave patient a list of numbers and resources to help maintain sobriety and safety after discharge.     Dr. Mar ordered patient's Phenobarbitol taper and the script was sent to patients home [facility] pharmacy. Patient verbalizes and demonstrates understanding of all teachings. Patient denies thoughts of harm towards self or others. Patient denies auditory/visual hallucinations. Pt denies any current medication side effects or medical issues at this time.     Patient denies any further questions and is now discharged at this time.

## 2018-09-10 NOTE — PLAN OF CARE
"Problem: Substance Withdrawal  Goal: Substance Withdrawal  Signs and symptoms of listed problems will be absent or manageable.  1;Opiate Withdrawal     1) Patient will achieve medical stabilization of acute withdrawal sx.  2) Patient will remain safe and free from injury  3) Patient will demonstrate improvement of ADLs (appetite, hygiene)  4) Verbalize reduction of fear or anxiety to a manageable level.  5) Verbalize knowledge of substance abuse as a disease  6) Verbalize risks and negative effects related to drug ingestion  7) Demonstrate participation in unit programming and attends specific substance use group therapy (i.e AA meetings)  8) Accept referral to substance abuse treatment  9) Express sense of regaining some control of situation/life (possible by verbalizing alternative coping mechanisms as alternatives to substance use in response to stress)   Outcome: No Change    Patient remains isolative, withdrawn this am. Pt requested breakfast tray be brought in her room. Pt reporting numerous withdrawal symptoms of chills, body aches, and feeling restless. Pt COWS = 12, MSSA = 10 upon first am assessment. Pt reports back discomfort, Lidoderm patch placed this am. Pt requested/recieved Ibuprofen 600mg x 1 (See MAR) for body aches. Pt reports this is helpful for her. Pt remains extremely anxious. Pt denies SI/SIB/HI. Pt denies auditory/visual hallucinations. Pt's affect is blunted/flat, mood is irritable/sad. Pt is tearful at times. Patient is tolerating medications well, denies any current side effects. Pt's discharge plans are pending.     Blood pressure 113/84, pulse 91, temperature 98.1  F (36.7  C), temperature source Tympanic, resp. rate 16, height 1.613 m (5' 3.5\"), weight 77.1 kg (170 lb), SpO2 98 %.          "
"Problem: Substance Withdrawal  Goal: Substance Withdrawal  Signs and symptoms of listed problems will be absent or manageable.  1;Opiate Withdrawal     1) Patient will achieve medical stabilization of acute withdrawal sx.  2) Patient will remain safe and free from injury  3) Patient will demonstrate improvement of ADLs (appetite, hygiene)  4) Verbalize reduction of fear or anxiety to a manageable level.  5) Verbalize knowledge of substance abuse as a disease  6) Verbalize risks and negative effects related to drug ingestion  7) Demonstrate participation in unit programming and attends specific substance use group therapy (i.e AA meetings)  8) Accept referral to substance abuse treatment  9) Express sense of regaining some control of situation/life (possible by verbalizing alternative coping mechanisms as alternatives to substance use in response to stress)  Outcome: No Change    Patient is up and visible in the milieu. Patient is alert and oriented x 4. Patient is attending/participating in unit programming. Pt is social with peers. Affect is tense/irritable, mood is labile, tearful, frustrated. Patient denies SI/SIB/HI. Pt denies auditory/visual hallucinations. Patient is reporting quite a bit of lower back pain, often crying in her room and at the . Internal medicine PA, Myla Moran, paged and notified.     This RN administered the PRN medications Ibuprofen 600mg x 1 for back pain and hydroxyzine 25mg x 1 for anxiety, (see MAR) at the request of the patient. Patient is tolerating medications well, denies any current side effects.     Pt's MSSA = 8 upon first morning withdrawal assessment, and COWS = 6. Patient reports a poor appetite, and poor sleep. Patient discharge plans pending.     Blood pressure 108/74, pulse 80, temperature 97.1  F (36.2  C), temperature source Oral, resp. rate 16, height 1.613 m (5' 3.5\"), weight 77.1 kg (170 lb), SpO2 98 %.      "
Problem: General Plan of Care (Inpatient Behavioral)  Goal: Team Discussion  Team Plan:  BEHAVIORAL TEAM DISCUSSION    Participants: Dr. Anuradha Mar MD; Sofi JOSEPH LPC  Progress: Initial  Continued Stay Criteria/Rationale: Pt was admitted to [facility] station 3A for symptoms of withdrawal. Symptoms are being managed and treated by 3A medical staff.  Medical/Physical: Deferred to medicine.  Precautions:   Behavioral Orders   Procedures     Code 1 - Restrict to Unit     Routine Programming     As clinically indicated     Seizure precautions     Status 15     Every 15 minutes.     Withdrawal precautions     Plan: Monitor, assess, stabilize. Pt declines case management assistance. Requesting discharge. Pt will be discharged from the hospital against medical advice.  Rationale for change in precautions or plan: N/A        
Problem: Substance Withdrawal  Goal: Substance Withdrawal  Signs and symptoms of listed problems will be absent or manageable.  1;Opiate Withdrawal     1) Patient will achieve medical stabilization of acute withdrawal sx.  2) Patient will remain safe and free from injury  3) Patient will demonstrate improvement of ADLs (appetite, hygiene)  4) Verbalize reduction of fear or anxiety to a manageable level.  5) Verbalize knowledge of substance abuse as a disease  6) Verbalize risks and negative effects related to drug ingestion  7) Demonstrate participation in unit programming and attends specific substance use group therapy (i.e AA meetings)  8) Accept referral to substance abuse treatment  9) Express sense of regaining some control of situation/life (possible by verbalizing alternative coping mechanisms as alternatives to substance use in response to stress)   Outcome: Declining  S:  Pt complained that she is receiving no relief from the Buprenorphine that she has received so far.  Her last use was yesterday (9/7/18) at 17:00.  She received an initial dose of 2 mg at 17:03 and a subsequent 2 mg dose at 20:39.  Her COWS score increased from 11 to 14 this evening.  She was tearful half an hour after she had received the evening dose, as she reported no abatement of her withdrawal symptoms.  B: Pt admitted for opiate and benzodiazepine (BZ) withdrawal and detoxification.  A: Pt in moderate opiate withdrawal with little response to first two doses of Buprenorphine AEB COWS scores of 11 & 14 at 16:00 & 20:00 respectively.  Pt in mild to moderate BZ withdrawal AEB MSSA scores of 5 & 13 at 16:00 & 20:00 respectively.  R:  Administered Buprenorphine 2 mg X 2, Ibuprofen 600 mg and phenobarbital 32.4 mg. Page sent to on call provider.  Received a one time order for Buprenorphine 4 mg and administered it.  Continue to monitor.  Provide with comfort medications as ordered and indicated.      
EMS

## 2018-09-10 NOTE — DISCHARGE INSTRUCTIONS
Behavioral Discharge Planning and Instructions  THANK YOU FOR CHOOSING THE [facility]  3A  [phone]    Summary: You were admitted to Station 3A on 9/7/2018 for detoxification from opiates and benzodiazepines.  A medical exam was performed that included lab work. You have met with a  and opted to discharge against medical advice.  Please take care and make your recovery a daily priority, Alexandrea! It was a pleasure working with you and the entire treatment team on 3A wishes you the very best in your recovery!     Main Diagnoses:  Per Dr. Amaro;  1.  Opiate use disorder, severe.   2.  Stimulant use disorder, amphetamine-type substance, moderate.   3.  Sedative hypnotic use disorder, moderate.   4.  Unspecified anxiety disorder.   5.  History of major depressive disorder.     Major Treatments, Procedures and Findings:  You were treated for opiate and benzodiazepine detoxification using the appropriate protocol(s). You had labs drawn and those results were reviewed with you. Please take a copy of your lab work with you to your next primary care physician appointment.    Symptoms to Report:  If you experience more anxiety, confusion, sleeplessness, deep sadness or thoughts of suicide, notify your treatment team or notify your primary care physician. IF ANY OF THE SYMPTOMS YOU ARE EXPERIENCING ARE A MEDICAL EMERGENCY CALL 911 IMMEDIATELY.     Lifestyle Adjustment: Adjust your lifestyle to get enough sleep, relaxation, exercise and good nutrition. Continue to develop healthy coping skills to decrease stress and promote a sober living environment. Do not use mood altering substances including alcohol, illegal drugs or addictive medications other than what is currently prescribed.     Disposition: Home or Self Care    Follow-up Appointment:   ~ Please make a follow-up appointment with primary care provider within 1 week of discharge ~    Resources:   AA/NA and Sponsors are excellent  resources for support and you can find one at any support group meeting.   SMART Recovery - self management for addiction recovery:  www.smartrecovery.org  http://www.aastpaul.org/?topic=8  http://a[place].org/meetings/  http://www.nam[place].org/index.php/meeting-list-pdf  Pathways ~ A Health Crisis Resource & Support Center:  814.248.6846.  http://www.harmreduction.org  [facility] [phone]  Support Group:  AA/NA and Sponsor/support.  National [place] on Mental Illness (www.[place].org): 328.873.8754 or 437-112-1912.  Alcoholics Anonymous (www.alcoholics-anonymous.org): Check your phone book for your local chapter.  Suicide Awareness Voices of Education (SAVE) (www.save.org): 996-356-LRRW (3083)  National Suicide Prevention Line (www.mentalhealthmn.org): 014-239-YLQG (1744)  Mental Health Consumer/Survivor Network of [place] (www.mhcsn.net): 533.945.7021 or 767-995-2041  Mental Health Association of [place] (www.mentalhealth.org): 692.375.3564 or 870-515-6165   Substance Abuse and Mental Health Services (www.samhsa.gov)    [place] Recovery Connection ([facility])  [facility] connects people seeking recovery to resources that help foster and sustain long-term recovery.  Whether you are seeking resources for treatment, transportation, housing, job training, education, health care or other pathways to recovery, [facility] is a great place to start.  189.790.2335.  www.[facility].org    General Medication Instructions:   See your medication sheet(s) for instructions.   Take all medications as prescribed.  Make no changes unless your doctor suggests them.   Go to all your doctor visits.  Be sure to have all your required lab tests. This way, your medicines can be refilled on time.  Do not use any forms of alcohol.    Please Note:  If you have any questions at anytime after you are discharged please call the [facility] detox unit 3AW unit at [phone].  [facility]  [facility] Behavioral Intake [phone]  Medical Records call [phone]  Outpatient Behavioral Intake call [phone]  LP+ Wait List/Bed Availability call [phone]    Please take this discharge folder with you to all your follow up appointments, it contains your lab results, diagnosis, medication list and discharge recommendations.      THANK YOU FOR CHOOSING THE [facility]

## 2018-09-10 NOTE — PROGRESS NOTES
Writer met with pt to discuss aftercare plans. Pt reports she is requesting discharge from the hospital, declines interest in case management assistance or treatment referral.   Case management complete at this time.

## 2022-12-18 ENCOUNTER — HOSPITAL ENCOUNTER (EMERGENCY)
Facility: CLINIC | Age: 37
Discharge: HOME | End: 2022-12-18
Attending: EMERGENCY MEDICINE | Admitting: EMERGENCY MEDICINE
Payer: COMMERCIAL

## 2022-12-18 VITALS
HEIGHT: 64 IN | SYSTOLIC BLOOD PRESSURE: 103 MMHG | OXYGEN SATURATION: 99 % | DIASTOLIC BLOOD PRESSURE: 67 MMHG | BODY MASS INDEX: 36.88 KG/M2 | RESPIRATION RATE: 16 BRPM | HEART RATE: 78 BPM | WEIGHT: 216 LBS | TEMPERATURE: 97.6 F

## 2022-12-18 DIAGNOSIS — F11.90 OPIATE USE: ICD-10-CM

## 2022-12-18 PROCEDURE — 99283 EMERGENCY DEPT VISIT LOW MDM: CPT | Performed by: EMERGENCY MEDICINE

## 2022-12-18 RX ORDER — BUPRENORPHINE AND NALOXONE 4; 1 MG/1; MG/1
1 FILM, SOLUBLE BUCCAL; SUBLINGUAL 3 TIMES DAILY
COMMUNITY
End: 2022-12-18

## 2022-12-18 RX ORDER — BUPRENORPHINE AND NALOXONE 4; 1 MG/1; MG/1
1 FILM, SOLUBLE BUCCAL; SUBLINGUAL 3 TIMES DAILY
Qty: 3 FILM | Refills: 0 | Status: SHIPPED | OUTPATIENT
Start: 2022-12-18 | End: 2022-12-19

## 2022-12-18 ASSESSMENT — ENCOUNTER SYMPTOMS
DYSURIA: 0
COUGH: 0
VOMITING: 0
DYSPHORIC MOOD: 0
WEAKNESS: 0
BACK PAIN: 0
NAUSEA: 0
SORE THROAT: 0
ABDOMINAL PAIN: 0
FEVER: 0
SPEECH DIFFICULTY: 0

## 2022-12-18 NOTE — DISCHARGE INSTRUCTIONS
Take Suboxone as directed.  Contact your Suboxone prescriber in the morning for further refills.    Return to the emergency department if any concerns.

## 2022-12-18 NOTE — ED PROVIDER NOTES
ED Provider Note  [facility]      History     Chief Complaint   Patient presents with     Medication Refill     Seeking  suboxone refill, had last dose this am     HPI  Alexandrea Linton is a 37 year old female with a history of opiate use disorder on Suboxone x2 weeks who presents to the emergency department seeking Suboxone refill.  Patient states that she had her dose increased to 4-1 mg 3 times daily last week and now took her last dose of Suboxone this morning.  She denies any current withdrawal symptoms.  She denies any current mental health concerns including depression and suicidal ideation.  She denies any recent illness or medical concerns.  She has a Suboxone prescriber and is currently engaged in treatment through [facility].    Past Medical History  Past Medical History:   Diagnosis Date     CARDIOVASCULAR SCREENING; LDL GOAL LESS THAN 160      Substance abuse (H)      Past Surgical History:   Procedure Laterality Date     NO HISTORY OF SURGERY       buprenorphine HCl-naloxone HCl (SUBOXONE) 4-1 MG per film  NO ACTIVE MEDICATIONS      Allergies   Allergen Reactions     Latex      Rash     Family History  Family History   Problem Relation Age of Onset     Family History Negative Unknown      Social History   Social History     Tobacco Use     Smoking status: Former     Types: Cigarettes     Tobacco comments:     1/4 PPD   Substance Use Topics     Alcohol use: Not Currently     Comment: few times per month     Drug use: Not Currently      Past medical history, past surgical history, medications, allergies, family history, and social history were reviewed with the patient. No additional pertinent items.       Review of Systems   Constitutional: Negative for fever.   HENT: Negative for sore throat.    Respiratory: Negative for cough.    Cardiovascular: Negative for chest pain.   Gastrointestinal: Negative for abdominal pain, nausea and vomiting.   Genitourinary: Negative for  "dysuria.   Musculoskeletal: Negative for back pain.   Neurological: Negative for speech difficulty and weakness.   Psychiatric/Behavioral: Negative for dysphoric mood and suicidal ideas.   All other systems reviewed and are negative.    A complete review of systems was performed with pertinent positives and negatives noted in the HPI, and all other systems negative.    Physical Exam   BP: 103/67  Pulse: 78  Temp: 97.6  F (36.4  C)  Resp: 16  Height: 162.6 cm (5' 4\")  Weight: 98 kg (216 lb)  SpO2: 99 %  Physical Exam  Vitals and nursing note reviewed.   Constitutional:       Appearance: Normal appearance.   HENT:      Head: Normocephalic and atraumatic.      Nose: Nose normal.      Mouth/Throat:      Mouth: Mucous membranes are moist.   Eyes:      Pupils: Pupils are equal, round, and reactive to light.   Cardiovascular:      Rate and Rhythm: Normal rate and regular rhythm.      Pulses: Normal pulses.      Heart sounds: Normal heart sounds.   Pulmonary:      Effort: Pulmonary effort is normal.      Breath sounds: Normal breath sounds.   Abdominal:      General: Bowel sounds are normal.   Musculoskeletal:         General: Normal range of motion.   Skin:     General: Skin is warm and dry.   Neurological:      General: No focal deficit present.      Mental Status: She is alert.      Motor: No weakness.      Coordination: Coordination normal.   Psychiatric:         Mood and Affect: Mood normal.         ED Course      Procedures         Mental Health Risk Assessment      PSS-3    Date and Time Over the past 2 weeks have you felt down, depressed, or hopeless? Over the past 2 weeks have you had thoughts of killing yourself? Have you ever attempted to kill yourself? When did this last happen? User   12/18/22 0810 yes no yes more than 6 months ago TFS                Item Assessment   Suicidal Ideation None   Plan None   Intent None   Suicidal or self-harm behaviors None   Risk Factors h/o substance use disorder   Protective " Factors Identified reasons for living              No results found for any visits on 12/18/22.  Medications - No data to display     Assessments & Plan (with Medical Decision Making)   37 year old female with history of opiate use disorder to the emergency department seeking 1 day prescription for Suboxone.  She reports a recent increase in her Suboxone dose to 4-1 mg 3 times daily.  Her recent prescription was confirmed through [place] .  1 day prescription for Suboxone prescribed.  She will contact her Suboxone prescriber in the morning tomorrow to obtain further refills.  She is currently engaged in chemical dependency treatment through [facility].  She denies any other acute medical or mental health concerns in the emergency department today.    I have reviewed the nursing notes. I have reviewed the findings, diagnosis, plan and need for follow up with the patient.    Current Discharge Medication List          Final diagnoses:   Opiate use - disorder     Chart documentation was completed with Dragon voice-recognition software. Even though reviewed, this chart may still contain some grammatical, spelling, and word errors.     --  Mehrdad Hopkins Md  [facility] EMERGENCY DEPARTMENT  12/18/2022     Mehrdad Hopkins MD  12/18/22 0534

## 2022-12-18 NOTE — ED TRIAGE NOTES
States that her dose was increased from 4mg bid to 4mg tid, increase was last monday     Triage Assessment     Row Name 12/18/22 0806       Triage Assessment (Adult)    Airway WDL WDL       Respiratory WDL    Respiratory WDL WDL       Skin Circulation/Temperature WDL    Skin Circulation/Temperature WDL WDL       Cardiac WDL    Cardiac WDL WDL       Peripheral/Neurovascular WDL    Peripheral Neurovascular WDL WDL       Cognitive/Neuro/Behavioral WDL    Cognitive/Neuro/Behavioral WDL WDL

## 2023-02-28 ENCOUNTER — OFFICE VISIT (OUTPATIENT)
Dept: FAMILY MEDICINE | Facility: CLINIC | Age: 38
End: 2023-02-28
Payer: COMMERCIAL

## 2023-02-28 VITALS
OXYGEN SATURATION: 96 % | HEIGHT: 66 IN | HEART RATE: 64 BPM | WEIGHT: 249 LBS | BODY MASS INDEX: 40.02 KG/M2 | SYSTOLIC BLOOD PRESSURE: 109 MMHG | DIASTOLIC BLOOD PRESSURE: 74 MMHG | TEMPERATURE: 97.6 F

## 2023-02-28 DIAGNOSIS — R63.5 WEIGHT GAIN: ICD-10-CM

## 2023-02-28 DIAGNOSIS — Z87.898 HISTORY OF SUBSTANCE USE: ICD-10-CM

## 2023-02-28 DIAGNOSIS — R60.0 PERIPHERAL EDEMA: ICD-10-CM

## 2023-02-28 DIAGNOSIS — R76.12 POSITIVE QUANTIFERON-TB GOLD TEST: ICD-10-CM

## 2023-02-28 DIAGNOSIS — N91.2 AMENORRHEA: ICD-10-CM

## 2023-02-28 DIAGNOSIS — Z11.3 ROUTINE SCREENING FOR STI (SEXUALLY TRANSMITTED INFECTION): ICD-10-CM

## 2023-02-28 DIAGNOSIS — Z12.4 CERVICAL CANCER SCREENING: ICD-10-CM

## 2023-02-28 DIAGNOSIS — Z00.00 HEALTH MAINTENANCE EXAMINATION: Primary | ICD-10-CM

## 2023-02-28 DIAGNOSIS — R79.89 ELEVATED SERUM CREATININE: ICD-10-CM

## 2023-02-28 DIAGNOSIS — E66.01 MORBID OBESITY (H): ICD-10-CM

## 2023-02-28 DIAGNOSIS — B18.2 CHRONIC HEPATITIS C WITHOUT HEPATIC COMA (H): ICD-10-CM

## 2023-02-28 LAB
CREAT SERPL-MCNC: 0.96 MG/DL (ref 0.51–0.95)
CREAT UR-MCNC: 95.1 MG/DL
GFR SERPL CREATININE-BSD FRML MDRD: 78 ML/MIN/1.73M2
HBA1C MFR BLD: 6.2 %
HCG UR QL: NEGATIVE
MICROALBUMIN UR-MCNC: <12 MG/L
MICROALBUMIN/CREAT UR: NORMAL MG/G{CREAT}
TSH SERPL DL<=0.005 MIU/L-ACNC: 3.7 UIU/ML (ref 0.3–4.2)

## 2023-02-28 PROCEDURE — 87624 HPV HI-RISK TYP POOLED RSLT: CPT | Mod: ORL | Performed by: NURSE PRACTITIONER

## 2023-02-28 PROCEDURE — 86709 HEPATITIS A IGM ANTIBODY: CPT | Mod: ORL | Performed by: NURSE PRACTITIONER

## 2023-02-28 PROCEDURE — 86780 TREPONEMA PALLIDUM: CPT | Mod: ORL | Performed by: NURSE PRACTITIONER

## 2023-02-28 PROCEDURE — 82565 ASSAY OF CREATININE: CPT | Mod: ORL | Performed by: NURSE PRACTITIONER

## 2023-02-28 PROCEDURE — 87340 HEPATITIS B SURFACE AG IA: CPT | Mod: ORL | Performed by: NURSE PRACTITIONER

## 2023-02-28 PROCEDURE — 86803 HEPATITIS C AB TEST: CPT | Mod: ORL | Performed by: NURSE PRACTITIONER

## 2023-02-28 PROCEDURE — 83036 HEMOGLOBIN GLYCOSYLATED A1C: CPT | Mod: ORL | Performed by: NURSE PRACTITIONER

## 2023-02-28 PROCEDURE — 86481 TB AG RESPONSE T-CELL SUSP: CPT | Mod: ORL | Performed by: NURSE PRACTITIONER

## 2023-02-28 PROCEDURE — 87591 N.GONORRHOEAE DNA AMP PROB: CPT | Mod: ORL | Performed by: NURSE PRACTITIONER

## 2023-02-28 PROCEDURE — 86706 HEP B SURFACE ANTIBODY: CPT | Mod: ORL | Performed by: NURSE PRACTITIONER

## 2023-02-28 PROCEDURE — 82570 ASSAY OF URINE CREATININE: CPT | Mod: ORL | Performed by: NURSE PRACTITIONER

## 2023-02-28 PROCEDURE — 86708 HEPATITIS A ANTIBODY: CPT | Mod: ORL | Performed by: NURSE PRACTITIONER

## 2023-02-28 PROCEDURE — G0145 SCR C/V CYTO,THINLAYER,RESCR: HCPCS | Mod: ORL | Performed by: NURSE PRACTITIONER

## 2023-02-28 PROCEDURE — 87491 CHLMYD TRACH DNA AMP PROBE: CPT | Mod: ORL | Performed by: NURSE PRACTITIONER

## 2023-02-28 PROCEDURE — 84443 ASSAY THYROID STIM HORMONE: CPT | Mod: ORL | Performed by: NURSE PRACTITIONER

## 2023-02-28 PROCEDURE — 87522 HEPATITIS C REVRS TRNSCRPJ: CPT | Mod: ORL | Performed by: NURSE PRACTITIONER

## 2023-02-28 ASSESSMENT — ANXIETY QUESTIONNAIRES
7. FEELING AFRAID AS IF SOMETHING AWFUL MIGHT HAPPEN: SEVERAL DAYS
GAD7 TOTAL SCORE: 15
GAD7 TOTAL SCORE: 15
3. WORRYING TOO MUCH ABOUT DIFFERENT THINGS: MORE THAN HALF THE DAYS
6. BECOMING EASILY ANNOYED OR IRRITABLE: MORE THAN HALF THE DAYS
IF YOU CHECKED OFF ANY PROBLEMS ON THIS QUESTIONNAIRE, HOW DIFFICULT HAVE THESE PROBLEMS MADE IT FOR YOU TO DO YOUR WORK, TAKE CARE OF THINGS AT HOME, OR GET ALONG WITH OTHER PEOPLE: VERY DIFFICULT
5. BEING SO RESTLESS THAT IT IS HARD TO SIT STILL: NEARLY EVERY DAY
2. NOT BEING ABLE TO STOP OR CONTROL WORRYING: MORE THAN HALF THE DAYS
1. FEELING NERVOUS, ANXIOUS, OR ON EDGE: NEARLY EVERY DAY

## 2023-02-28 ASSESSMENT — PATIENT HEALTH QUESTIONNAIRE - PHQ9
SUM OF ALL RESPONSES TO PHQ QUESTIONS 1-9: 16
5. POOR APPETITE OR OVEREATING: MORE THAN HALF THE DAYS

## 2023-02-28 NOTE — LETTER
March 1, 2023      Alexandreaportia Linton  [address] [address]        Dear ,    We are writing to inform you of your test results.    Please make an appointment with your provider to review or follow up on your test results.  Appointments can be made by calling [phone].    Resulted Orders   Hepatitis C Screen Reflex to HCV RNA Quant and Genotype   Result Value Ref Range    Hepatitis C Antibody Reactive (A) Nonreactive      Comment:      A reactive result indicates one of the following   1) Current HCV infection   2) Past HCV infection that has resolved or   3) False positivity.     The CDC recommends that a reactive result should be followed by Nucleic acid testing for HCV RNA. If HCV RNA is detected, that indicates current HCV infection.   If HCV RNA is not detected, that indicates either past, resolved HCV infection, or false HCV antibody positivity.    Narrative    Assay performance characteristics have not been established for newborns, infants, and children.   Hepatitis B surface antigen   Result Value Ref Range    Hepatitis B Surface Antigen Nonreactive Nonreactive   Hepatitis B Surface Antibody   Result Value Ref Range    Hepatitis B Surface Antibody Instrument Value 80.25 <8.00 m[IU]/mL    Hepatitis B Surface Antibody Reactive       Comment:      Patient is considered to be immune to infection with hepatitis B when the value is greater than or equal to 12.00 mIU/mL.   Hepatitis A antibody IgM   Result Value Ref Range    Hepatitis A Antibody IgM Nonreactive Nonreactive      Comment:      IgM anti-HAV not detected. Does not exclude the possibility of recent exposure to or infection with HAV.   Hepatitis A Antibody IgG   Result Value Ref Range    Hepatitis A Antibody IgG Nonreactive Nonreactive    Narrative    This assay cannot be used for the diagnosis of acute HAV infection.   Creatinine   Result Value Ref Range    Creatinine 0.96 (H) 0.51 - 0.95 mg/dL    GFR Estimate 78 >60  mL/min/1.73m2      Comment:      eGFR calculated using 2021 CKD-EPI equation.   TSH with free T4 reflex   Result Value Ref Range    TSH 3.70 0.30 - 4.20 uIU/mL   Extra SST Tube (LAB USE ONLY)   Result Value Ref Range    Hold Specimen JIC    Albumin Random Urine Quantitative with Creat Ratio   Result Value Ref Range    Creatinine Urine mg/dL 95.1 mg/dL      Comment:      The reference ranges have not been established in urine creatinine. The results should be integrated into the clinical context for interpretation.    Albumin Urine mg/L <12.0 mg/L      Comment:      The reference ranges have not been established in urine albumin. The results should be integrated into the clinical context for interpretation.    Albumin Urine mg/g Cr        Comment:      Unable to calculate, urine albumin and/or urine creatinine is outside detectable limits.  Microalbuminuria is defined as an albumin:creatinine ratio of 17 to 299 for males and 25 to 299 for females. A ratio of albumin:creatinine of 300 or higher is indicative of overt proteinuria.  Due to biologic variability, positive results should be confirmed by a second, first-morning random or 24-hour timed urine specimen. If there is discrepancy, a third specimen is recommended. When 2 out of 3 results are in the microalbuminuria range, this is evidence for incipient nephropathy and warrants increased efforts at glucose control, blood pressure control, and institution of therapy with an angiotensin-converting-enzyme (ACE) inhibitor (if the patient can tolerate it).     Hemoglobin A1c   Result Value Ref Range    Hemoglobin A1C 6.2 (H) <5.7 %      Comment:      Normal <5.7%   Prediabetes 5.7-6.4%    Diabetes 6.5% or higher     Note: Adopted from ADA consensus guidelines.   HCG qualitative urine   Result Value Ref Range    hCG Urine Qualitative Negative Negative      Comment:      This test is for screening purposes.  Results should be interpreted along with the clinical picture.   Confirmation testing is available if warranted by ordering [id number], HCG Quantitative Pregnancy.   NEISSERIA GONORRHOEA PCR   Result Value Ref Range    Neisseria gonorrhoeae Negative Negative      Comment:      Negative for N. gonorrhoeae rRNA by transcription mediated amplification. A negative result by transcription mediated amplification does not preclude the presence of C. trachomatis infection because results are dependent on proper and adequate collection, absence of inhibitors and sufficient rRNA to be detected.   CHLAMYDIA TRACHOMATIS PCR   Result Value Ref Range    Chlamydia trachomatis Negative Negative      Comment:      A negative result by transcription mediated amplification does not preclude the presence of C. trachomatis infection because results are dependent on proper and adequate collection, absence of inhibitors and sufficient rRNA to be detected.     Please return to the clinic to discuss your lab results. Thank you  If you have any questions or concerns, please call the clinic at the number listed above.       Sincerely,      ALYSSIA Griffith CNP

## 2023-02-28 NOTE — PROGRESS NOTES
"[facility]ICAL [facility]AM[facility]ATION [facility]    Patient: Alexandrea Linton    YOB: 1985  Sex:  female    Date of Exam: 2/28/23    Arrival Time: 03 52 PM  Departure Time: 05 20 PM    Vitals: /74   Pulse 64   Temp 97.6  F (36.4  C) (Oral)   Ht 1.666 m (5' 5.6\")   Wt 112.9 kg (249 lb)   LMP 11/18/2022   SpO2 96%   BMI 40.68 kg/m      Patient Concerns:   Chief Complaint   Patient presents with     Physical     Pap     -Irregular menses/Amenorrhea. LMP 11/22. Has had numerous UPT and serum HCG, all negative per chart review. Uncertain of last pap date, would like today.   Current concern- reports weight gain of up to 30-50lbs since 12/22. Thinks it may be related to diet, but worried about other things. [facility] DM.   -+1 pitting edema to lower extremeties for past 1-2 months. Previously seen at [facility] and brief lasix treatment but indicates did not improve edema.  German SOB, chest pain or irregular heart beat.     PHQ 2/28/2023   PHQ-9 Total Score 16   Q9: Thoughts of better off dead/self-harm past 2 weeks Not at all     Does the patient currently have a mental health provider?  Yes, patient was referred back to current mental health provider.    Past Medical History:   Diagnosis Date     Anxiety      CARDIOVASCULAR SCREENING; LDL GOAL LESS THAN 160      Depression      Substance abuse (H)        Past Surgical History:   Procedure Laterality Date     NO HISTORY OF SURGERY         Family History   Problem Relation Age of Onset     Mental Illness Mother      Hypertension Mother      Substance Abuse Father      Mental Illness Father      Diabetes Father      Alcoholism Sister      Alcoholism Sister      Hypertension Maternal Grandmother      Substance Abuse Paternal Grandmother      Diabetes Paternal Grandmother      Family History Negative Other      Breast Cancer No family hx of      Colon Cancer No family hx of        Social History     Tobacco Use     Smoking status: Former     Years: " 25.00     Types: Cigarettes     Quit date: 2022     Years since quittin.9     Smokeless tobacco: Not on file     Tobacco comments:      PPD   Substance Use Topics     Alcohol use: Not Currently       Medications:   Current Outpatient Medications   Medication Sig Dispense Refill     NO ACTIVE MEDICATIONS      *Medications with dosages requested to be faxed from [facility]. Did not receive at time of encounter but available in chart on 3/1/2023:  Current Outpatient Medications   Medication     albuterol (PROAIR HFA/PROVENTIL HFA/VENTOLIN HFA) 108 (90 Base) MCG/ACT inhaler     budesonide-formoterol (SYMBICORT) 80-4.5 MCG/ACT Inhaler     buprenorphine HCl-naloxone HCl (SUBOXONE) 8-2 MG per film     busPIRone (BUSPAR) 10 MG tablet     cholecalciferol 50 MCG ( UT) CAPS     cloNIDine (CATAPRES) 0.1 MG tablet     escitalopram (LEXAPRO) 20 MG tablet     gabapentin (NEURONTIN) 800 MG tablet     hydrOXYzine (VISTARIL) 50 MG capsule     ibuprofen (ADVIL/MOTRIN) 800 MG tablet     mirtazapine (REMERON) 30 MG tablet     NO ACTIVE MEDICATIONS     ondansetron (ZOFRAN) 4 MG tablet     prazosin (MINIPRESS) 2 MG capsule     No current facility-administered medications for this visit.         ROS:  Review Of Systems  Skin: negative for rash, bruising, lumps or bumps  Eyes: negative for visual blurring, eye pain, redness or change in vision. Wears glasses. Had recent eye exam.   Ears/Nose/Throat: negative for postnasal drainage, hearing loss, sinus trouble. Occasional nasal congestion post pneumonia in January. Does have history of seasonal allergies. Has used loratadine and allegra in the past.   Respiratory: No shortness of breath, dyspnea on exertion, cough, or hemoptysis. Treated for pneumonia 23 and 23 and received course of prednisone. Feels symptoms resolved. Chart review showed questionable nodular infiltrates in the perihilar upper right lung and in the right lung base on 23 xray. CXR  showed negative  chest. History of asthma.   Cardiovascular: negative for palpitations, tachycardia, irregular heart beat and chest pain. Edema as above. Some tenderness, no redness or warmth  Gastrointestinal: negative for poor appetite, nausea, hemorrhoids and constipation  Genitourinary: negative for frequency, urgency and hematuria. Negative for vaginal discharge. Recent work up for UTI negative. History of pyelonephritis on chart review.   Musculoskeletal: negative for joint pain, joint swelling and muscular weakness  Neurologic: negative for headaches, local weakness and speech problems  Psychiatric: positive for excessive stress and depression. Negative for thoughts of harm to self or others. Reports is seeing counselor at [facility] and this is helping. On medications, not available at time of encounter.   Hematologic/Lymphatic/Immunologic: negative for bleeding disorder, fever and swollen nodes  Endocrine: positive for polydipsia, negative for cold intolerance, hot flashes and night sweats  PHQ 2/28/2023   PHQ-9 Total Score 16   Q9: Thoughts of better off dead/self-harm past 2 weeks Not at all     SAMIA-7 SCORE 2/28/2023   Total Score 15         General Physical Exam:  Exam:  Constitutional: healthy, alert and no distress  Head: Normocephalic. No masses, lesions, tenderness or abnormalities  Neck: Neck supple. No adenopathy. Thyroid symmetric, normal size,  EENT: Eyes: clear, BRET, discs crisp. EOM intact. Ears: TMs gray. Nose: mild edema, erythema, scant clear discharge.Negative sinus tenderness.  OP clear. , Cardiovascular: HRRR PMI normal. No lifts, heaves, or thrills. S1, S2  No murmurs, clicks gallops or rub. +1 pitting edema to lower extremities to proximal tibia bilaterally.   Respiratory: Good diaphragmatic excursion. Lungs clear  Breast: negative puckering or dimpling, non tender, no masses. Negative lymph.   Gastrointestinal: Abdomen soft, non-tender. BS normal. No masses, organomegaly  : external genitalia without  lesions, negative BUS. Vagina pink moist with small to moderate white, non-odorous discharge. Pap and gen probe collected with scant blood on pap collection. No bimanual exam performed today; will do next visit  Musculoskeletal: 1+ pitting edema. No redness, warmth, mildly tender to palpation overall. Sock marks visible.   Skin: no suspicious lesions or rashes. No lesions on lower legs. Feet: erythematous area to lateral aspect 5th small toe on both feet. Calloused area bunion area bilateral feet.   Neurologic: Gait normal. Tone intact.  Sensation grossly WNL.  Psychiatric: mentation appears normal but fatigued  Hematologic/Lymphatic/Immunologic: Normal cervical lymph nodes      All labs required for [facility] will be completed during this visit: HIV, Hepatitis A (IgM &IgG), Hepatitis B (IgM &IgG), Hepatitis C, Quantiferon Gold, Pregnancy Test  HIV: nonreactive HIV on [facility] records 2/8/23 so not repeated today.     Allergies:   Allergies   Allergen Reactions     Latex      Rash     Shellfish-Derived Products        Are there any conditions that may endanger the health of the staff or Clients in our residential program? No none observed    Is there any reason why this applicant should not assist in the preparation of food? No    This applicant is okay to admit for services to [facility]? Yes     The above client is a mutual patient at [facility], and the [facility] Practitioners Clinic would like our patient to continue taking her medication as prescribed upon discharging from [facility].     I authorize the above patient to take all of her medication with her upon discharging from [facility]. Yes     Diagnoses:   Encounter Diagnoses   Name Primary?     Health maintenance examination Yes     History of substance use      Peripheral edema      Elevated serum creatinine      Morbid obesity (H)       Amenorrhea      Cervical cancer screening      Health Maintenance Exam  Pap, Labs today: Albumin, Creatinine, UPT, Hemoglobin A1c, Hepatitis A, B & C, TB, TSH, Quantiferon  Reviewed healthy diet, exercise   Tdap today/ Declined flu vaccine    Obesity and weight gain  Referred to nutrition, discussed portion size, increased vegetables. Obtain TSH and A1c today given [facility] DM. [facility] labs: borderline creatinine 1.02, eGFR 73, elevated glucose (115). TSH 3.15    Peripheral edema  Compression stockings. Limit salt in diet. RTC 1 week to review labs, address peripheral edema and other concerns. No cardiac findings today.  Increase activity. Could consider diuretic but BP is lower normal, no reported improvement in leg edema.     Lab follow ups  Previous HCV antibody positive, but reflex RNA negative. Will recheck today; could be false positive or reflect previous infection that cleared or was treated. Does not recount previous treatment.     Add urine microalbumin given mildly elevated creatinine and reduced eGFR.    Mental health  Continue with MH counseling, medications as previously prescribed. Follow with MH provider.  Seek care if worsening symptoms.     Amenorrhea  Obtain pap today. STI screening completed. Follow up next week and do bimanual at that time as not done today.  Could consider hormone levels, Prolactin level. Will recheck TSH today.     Referrals   Referral to Nutritionist for weight management    Follow up plan   Please make an in person follow up visit with ALYSSIA Wilkinson, CNP in 1-2 weeks to discuss lab results, weight gain, mental health, irregular menses, and bimanual exam.   -Encouraged to establish Dental care  -Compression stockings to lower extremeties    Traci Rogers, RNC-OB, Student Nurse Midwife  [facility]   I was present with the NPP student who participated in the service and in the documentation of the services provided. I have verified the history and personally  performed the physical exam and medical decision making, as documented by the student and edited by ALYSSIA Judd CNP  03/01/23  2:18 PM    Quantiferon positive. Notifed clinic staff to notify ALYSSIA Isaacs CNP, CNP     Fax completed forms to: [phone]

## 2023-02-28 NOTE — PATIENT INSTRUCTIONS
Health Maintenance Exam:  -Labs today: Albumin, Creatinine, UPT, Hemoglobin A1c, Hepatitis A, B & C, TB, TSH  -Recent weight gain: referral to nutritionist  -Mental Health: continue seeing current provider and continue on prescription medications  -Compression stockings for pitting edema to lower extremities. Recommend wider shoes if able.   -TDAP today.  -Recommend returning to clinic for follow up on labs, mental health, menstrual period irregularity, and weight gain.

## 2023-02-28 NOTE — LETTER
March 2, 2023      Alexandreaportia Linton  [address] [address]        Dear ,    We are writing to inform you of your test results.    Test results indicate you may require additional follow up, see comment below.  Your tuberculosis screening test is positive. I have ordered a follow up chest xray for you.Please schedule a follow up appointment next week with me to review all lab results.     Resulted Orders   Hepatitis C Screen Reflex to HCV RNA Quant and Genotype   Result Value Ref Range    Hepatitis C Antibody Reactive (A) Nonreactive      Comment:      A reactive result indicates one of the following   1) Current HCV infection   2) Past HCV infection that has resolved or   3) False positivity.     The CDC recommends that a reactive result should be followed by Nucleic acid testing for HCV RNA. If HCV RNA is detected, that indicates current HCV infection.   If HCV RNA is not detected, that indicates either past, resolved HCV infection, or false HCV antibody positivity.    Narrative    Assay performance characteristics have not been established for newborns, infants, and children.   Hepatitis B surface antigen   Result Value Ref Range    Hepatitis B Surface Antigen Nonreactive Nonreactive   Hepatitis B Surface Antibody   Result Value Ref Range    Hepatitis B Surface Antibody Instrument Value 80.25 <8.00 m[IU]/mL    Hepatitis B Surface Antibody Reactive       Comment:      Patient is considered to be immune to infection with hepatitis B when the value is greater than or equal to 12.00 mIU/mL.   Hepatitis A antibody IgM   Result Value Ref Range    Hepatitis A Antibody IgM Nonreactive Nonreactive      Comment:      IgM anti-HAV not detected. Does not exclude the possibility of recent exposure to or infection with HAV.   Hepatitis A Antibody IgG   Result Value Ref Range    Hepatitis A Antibody IgG Nonreactive Nonreactive    Narrative    This assay cannot be used for the diagnosis of acute HAV  infection.   Creatinine   Result Value Ref Range    Creatinine 0.96 (H) 0.51 - 0.95 mg/dL    GFR Estimate 78 >60 mL/min/1.73m2      Comment:      eGFR calculated using 2021 CKD-EPI equation.   TSH with free T4 reflex   Result Value Ref Range    TSH 3.70 0.30 - 4.20 uIU/mL   Quantiferon TB Gold Plus Grey Tube   Result Value Ref Range    Quantiferon Nil Tube 0.07 IU/mL   Quantiferon TB Gold Plus Green Tube   Result Value Ref Range    Quantiferon TB1 Tube 0.46 IU/mL   Quantiferon TB Gold Plus Yellow Tube   Result Value Ref Range    Quantiferon TB2 Tube 0.52    Quantiferon TB Gold Plus Purple Tube   Result Value Ref Range    Quantiferon Mitogen 10.00 IU/mL   Extra SST Tube (LAB USE ONLY)   Result Value Ref Range    Hold Specimen JI    Albumin Random Urine Quantitative with Creat Ratio   Result Value Ref Range    Creatinine Urine mg/dL 95.1 mg/dL      Comment:      The reference ranges have not been established in urine creatinine. The results should be integrated into the clinical context for interpretation.    Albumin Urine mg/L <12.0 mg/L      Comment:      The reference ranges have not been established in urine albumin. The results should be integrated into the clinical context for interpretation.    Albumin Urine mg/g Cr        Comment:      Unable to calculate, urine albumin and/or urine creatinine is outside detectable limits.  Microalbuminuria is defined as an albumin:creatinine ratio of 17 to 299 for males and 25 to 299 for females. A ratio of albumin:creatinine of 300 or higher is indicative of overt proteinuria.  Due to biologic variability, positive results should be confirmed by a second, first-morning random or 24-hour timed urine specimen. If there is discrepancy, a third specimen is recommended. When 2 out of 3 results are in the microalbuminuria range, this is evidence for incipient nephropathy and warrants increased efforts at glucose control, blood pressure control, and institution of therapy with an  angiotensin-converting-enzyme (ACE) inhibitor (if the patient can tolerate it).     Hemoglobin A1c   Result Value Ref Range    Hemoglobin A1C 6.2 (H) <5.7 %      Comment:      Normal <5.7%   Prediabetes 5.7-6.4%    Diabetes 6.5% or higher     Note: Adopted from ADA consensus guidelines.   HCG qualitative urine   Result Value Ref Range    hCG Urine Qualitative Negative Negative      Comment:      This test is for screening purposes.  Results should be interpreted along with the clinical picture.  Confirmation testing is available if warranted by ordering [id number], HCG Quantitative Pregnancy.   NEISSERIA GONORRHOEA PCR   Result Value Ref Range    Neisseria gonorrhoeae Negative Negative      Comment:      Negative for N. gonorrhoeae rRNA by transcription mediated amplification. A negative result by transcription mediated amplification does not preclude the presence of C. trachomatis infection because results are dependent on proper and adequate collection, absence of inhibitors and sufficient rRNA to be detected.   CHLAMYDIA TRACHOMATIS PCR   Result Value Ref Range    Chlamydia trachomatis Negative Negative      Comment:      A negative result by transcription mediated amplification does not preclude the presence of C. trachomatis infection because results are dependent on proper and adequate collection, absence of inhibitors and sufficient rRNA to be detected.   Treponema Abs w Reflex to RPR and Titer   Result Value Ref Range    Treponema Antibody Total Nonreactive Nonreactive   Quantiferon TB Gold Plus   Result Value Ref Range    Quantiferon-TB Gold Plus Positive (A) Negative      Comment:      Interferon gamma response to M.tuberculosis antigens was detected,suggesting infection with M.tuberculosis. Positive results in patients at low risk for infection should be interpreted with caution and repeat testing on a new sample should be considered as recommended by the 2017 ATS/IDSA/CDC Clinical Prac  jayson Guidelines for  Diagnosis of Tuberculosis in Adults and Children     TB1 Ag minus Nil Value 0.39 IU/mL    TB2 Ag minus Nil Value 0.45 IU/mL    Mitogen minus Nil Result 9.93 IU/mL    Nil Result 0.07 IU/mL       If you have any questions or concerns, please call the clinic at the number listed above.       Sincerely,      ALYSSIA Griffith CNP

## 2023-02-28 NOTE — LETTER
March 6, 2023      Alexandrea Linton  [address] [address]        Dear ,    We are writing to inform you of your test results.    Lab results show no immunity to hepatitis A, so you could start the  vaccine. Creatinine (Kidney function marker is just above normal and should be monitored. Test for protein in urine is okay. Your hepatitis C is positive, but the quantitative test and the follow up test are negative. Your  pap smear is normal and HPV is negative. This can be repeated in 3-5 years. Thyroid is normal. Chlamydia and gonorrhea negative. Your tuberculosis test was positive and you need to have a chest xray. This was ordered for you and needs to be scheduled by you and the team at [facility].  Your 3 month blood sugar average shows pre-diabetes. Please schedule a follow up visit to discuss these and next steps. Thanks.    Resulted Orders   Hepatitis C Screen Reflex to HCV RNA Quant and Genotype   Result Value Ref Range    Hepatitis C Antibody Reactive (A) Nonreactive      Comment:      A reactive result indicates one of the following   1) Current HCV infection   2) Past HCV infection that has resolved or   3) False positivity.     The CDC recommends that a reactive result should be followed by Nucleic acid testing for HCV RNA. If HCV RNA is detected, that indicates current HCV infection.   If HCV RNA is not detected, that indicates either past, resolved HCV infection, or false HCV antibody positivity.    Narrative    Assay performance characteristics have not been established for newborns, infants, and children.   Hepatitis B surface antigen   Result Value Ref Range    Hepatitis B Surface Antigen Nonreactive Nonreactive   Hepatitis B Surface Antibody   Result Value Ref Range    Hepatitis B Surface Antibody Instrument Value 80.25 <8.00 m[IU]/mL    Hepatitis B Surface Antibody Reactive       Comment:      Patient is considered to be immune to infection with hepatitis B when the value is  greater than or equal to 12.00 mIU/mL.   Hepatitis A antibody IgM   Result Value Ref Range    Hepatitis A Antibody IgM Nonreactive Nonreactive      Comment:      IgM anti-HAV not detected. Does not exclude the possibility of recent exposure to or infection with HAV.   Hepatitis A Antibody IgG   Result Value Ref Range    Hepatitis A Antibody IgG Nonreactive Nonreactive    Narrative    This assay cannot be used for the diagnosis of acute HAV infection.   Creatinine   Result Value Ref Range    Creatinine 0.96 (H) 0.51 - 0.95 mg/dL    GFR Estimate 78 >60 mL/min/1.73m2      Comment:      eGFR calculated using 2021 CKD-EPI equation.   TSH with free T4 reflex   Result Value Ref Range    TSH 3.70 0.30 - 4.20 uIU/mL   Quantiferon TB Gold Plus Grey Tube   Result Value Ref Range    Quantiferon Nil Tube 0.07 IU/mL   Quantiferon TB Gold Plus Green Tube   Result Value Ref Range    Quantiferon TB1 Tube 0.46 IU/mL   Quantiferon TB Gold Plus Yellow Tube   Result Value Ref Range    Quantiferon TB2 Tube 0.52    Quantiferon TB Gold Plus Purple Tube   Result Value Ref Range    Quantiferon Mitogen 10.00 IU/mL   Extra SST Tube (LAB USE ONLY)   Result Value Ref Range    Hold Specimen JIC    Albumin Random Urine Quantitative with Creat Ratio   Result Value Ref Range    Creatinine Urine mg/dL 95.1 mg/dL      Comment:      The reference ranges have not been established in urine creatinine. The results should be integrated into the clinical context for interpretation.    Albumin Urine mg/L <12.0 mg/L      Comment:      The reference ranges have not been established in urine albumin. The results should be integrated into the clinical context for interpretation.    Albumin Urine mg/g Cr        Comment:      Unable to calculate, urine albumin and/or urine creatinine is outside detectable limits.  Microalbuminuria is defined as an albumin:creatinine ratio of 17 to 299 for males and 25 to 299 for females. A ratio of albumin:creatinine of 300 or  higher is indicative of overt proteinuria.  Due to biologic variability, positive results should be confirmed by a second, first-morning random or 24-hour timed urine specimen. If there is discrepancy, a third specimen is recommended. When 2 out of 3 results are in the microalbuminuria range, this is evidence for incipient nephropathy and warrants increased efforts at glucose control, blood pressure control, and institution of therapy with an angiotensin-converting-enzyme (ACE) inhibitor (if the patient can tolerate it).     Hemoglobin A1c   Result Value Ref Range    Hemoglobin A1C 6.2 (H) <5.7 %      Comment:      Normal <5.7%   Prediabetes 5.7-6.4%    Diabetes 6.5% or higher     Note: Adopted from ADA consensus guidelines.   HCG qualitative urine   Result Value Ref Range    hCG Urine Qualitative Negative Negative      Comment:      This test is for screening purposes.  Results should be interpreted along with the clinical picture.  Confirmation testing is available if warranted by ordering PSX790, HCG Quantitative Pregnancy.   Pap screen with HPV - recommended age 30 - 65 years   Result Value Ref Range    Interpretation        Negative for Intraepithelial Lesion or Malignancy (NILM)    Comment         Papanicolaou Test Limitations:  Cervical cytology is a screening test with limited sensitivity, and regular screening is critical for cancer prevention.  Pap tests are primarily effective for the diagnosis/prevention of squamous cell carcinoma, not adenocarcinoma or other cancers.        Specimen Adequacy       Satisfactory for evaluation, endocervical/transformation zone component absent    Clinical Information       irregular bleeding[amenorrhea      LMP/Menopause Date       11/18/2022      Reflex Testing Yes regardless of result     Previous Abnormal?       No      Performing Labs       The technical component of this testing was completed at [facility]  Laboratory     NEISSERIA GONORRHOEA PCR   Result Value Ref Range    Neisseria gonorrhoeae Negative Negative      Comment:      Negative for N. gonorrhoeae rRNA by transcription mediated amplification. A negative result by transcription mediated amplification does not preclude the presence of C. trachomatis infection because results are dependent on proper and adequate collection, absence of inhibitors and sufficient rRNA to be detected.   CHLAMYDIA TRACHOMATIS PCR   Result Value Ref Range    Chlamydia trachomatis Negative Negative      Comment:      A negative result by transcription mediated amplification does not preclude the presence of C. trachomatis infection because results are dependent on proper and adequate collection, absence of inhibitors and sufficient rRNA to be detected.   Hepatitis C RNA, Quantitative by PCR with Confirmatory Reflex to Genotyping   Result Value Ref Range    Hepatitis C RNA IU/mL Not Detected Not Detected IU/mL    Narrative    The MiNOWireless AmpliPrep/MiNOWireless TaqMan HCV test is a FDA-approved in vitro nucleic acid amplification test for the quantitation of HCV DNA in human plasma (EDTA plasma) or serum using the MiNOWireless AmpliPrep instrument for automated viral nucleic acid extraction and the MiNOWireless TaqMan for the automated real-time PCR amplification and detection of viral nucleic acid target. Titer results are reported in International Units/mL (IU/mL) using the 1st WHO International standard for HBV for nucleic acid amplification assays.   Treponema Abs w Reflex to RPR and Titer   Result Value Ref Range    Treponema Antibody Total Nonreactive Nonreactive   Quantiferon TB Gold Plus   Result Value Ref Range    Quantiferon-TB Gold Plus Positive (A) Negative      Comment:      Interferon gamma response to M.tuberculosis antigens was detected,suggesting infection with M.tuberculosis. Positive results in patients at low risk for infection should be interpreted with caution and repeat testing on a  new sample should be considered as recommended by the 2017 ATS/IDSA/CDC Clinical Prac  jayson Guidelines for Diagnosis of Tuberculosis in Adults and Children     TB1 Ag minus Nil Value 0.39 IU/mL    TB2 Ag minus Nil Value 0.45 IU/mL    Mitogen minus Nil Result 9.93 IU/mL    Nil Result 0.07 IU/mL   HPV High Risk Types DNA Cervical   Result Value Ref Range    Other HR HPV Negative Negative    HPV16 DNA Negative Negative    HPV18 DNA Negative Negative    FINAL DIAGNOSIS       This patient's sample is negative for HPV DNA.        This test was developed and its performance characteristics determined by the [facility], Molecular Diagn[facility]. It has not been cleared or approved by the FDA. The laboratory is regulated under CLIA as qualified to perform high-complexity testing. This test is used for clinical purposes. It should not be regarded as investigational or for research.    METHODOLOGY: The Roche Fermin 4800 system uses automated extraction, simultaneous amplification of HPV (L1 region) and beta-globin, followed by real time detection of fluorescent labeled HPV and beta globin using specific oligonucleotide probes. The test specifically identifies types HPV 16 DNA and HPV 18 DNA while concurrently detecting the rest of the high risk types (31, 33, 35, 39, 45, 51, 52, 56, 58, 59, 66 or 68).    COMMENTS: This test is not intended for use as a screening device for woman under age 30 with normal cervical cytology. Results should be correlated with cytologic and histologic findings. Close clinical followup is recommended.           If you have any questions or concerns, please call the clinic at the number listed above.       Sincerely,      ALYSSIA Griffith CNP

## 2023-02-28 NOTE — NURSING NOTE
"ROOM:  [facility]    Preferred Name: Alexandrea     How did you hear about us?  Other - [facility]    37 year old  Chief Complaint   Patient presents with     Physical     Pap         Blood pressure 109/74, pulse 64, temperature 97.6  F (36.4  C), temperature source Oral, height 1.666 m (5' 5.6\"), weight 112.9 kg (249 lb), last menstrual period 11/18/2022, SpO2 96 %. Body mass index is 40.68 kg/m .  BP completed using cuff size:        Patient Active Problem List   Diagnosis     CARDIOVASCULAR SCREENING; LDL GOAL LESS THAN 160     Heroin dependence (H)       Wt Readings from Last 2 Encounters:   02/28/23 112.9 kg (249 lb)   12/18/22 98 kg (216 lb)     BP Readings from Last 3 Encounters:   02/28/23 109/74   12/18/22 103/67   09/10/18 120/82       Allergies   Allergen Reactions     Latex      Rash       Current Outpatient Medications   Medication     NO ACTIVE MEDICATIONS     No current facility-administered medications for this visit.       Social History     Tobacco Use     Smoking status: Former     Types: Cigarettes     Tobacco comments:     1/4 PPD   Substance Use Topics     Alcohol use: Not Currently     Drug use: Not Currently     Types: Fentanyl, Methamphetamines, \"Crack\" cocaine       Honoring Choices - Health Care Directive Guide offered to patient at time of visit.    Health Maintenance Due   Topic Date Due     YEARLY PREVENTIVE VISIT  Never done     ADVANCE CARE PLANNING  Never done     COVID-19 Vaccine (1) Never done     PAP  Never done     DTAP/TDAP/TD IMMUNIZATION (2 - Td or Tdap) 06/26/2022     INFLUENZA VACCINE (1) Never done         There is no immunization history on file for this patient.    No results found for: PAP    Recent Labs   Lab Test 09/08/18  1102   ALT 28   CR 0.89   GFRESTIMATED 73   GFRESTBLACK 89   ALBUMIN 2.9*   POTASSIUM 4.2   TSH 1.93       No flowsheet data found.    PHQ-9 SCORE 2/28/2023   PHQ-9 Total Score 16       SAMIA-7 SCORE 2/28/2023   Total Score 15       No flowsheet " data found.    Bob Owens    February 28, 2023 4:07 PM

## 2023-03-01 PROBLEM — R63.5 WEIGHT GAIN: Status: ACTIVE | Noted: 2023-03-01

## 2023-03-01 PROBLEM — R60.0 PERIPHERAL EDEMA: Status: ACTIVE | Noted: 2023-03-01

## 2023-03-01 PROBLEM — B18.2 HEPATITIS C, CHRONIC (H): Status: ACTIVE | Noted: 2023-03-01

## 2023-03-01 LAB
C TRACH DNA SPEC QL NAA+PROBE: NEGATIVE
HAV IGG SER QL IA: NONREACTIVE
HAV IGM SERPL QL IA: NONREACTIVE
HBV SURFACE AB SERPL IA-ACNC: 80.25 M[IU]/ML
HBV SURFACE AB SERPL IA-ACNC: REACTIVE M[IU]/ML
HBV SURFACE AG SERPL QL IA: NONREACTIVE
HCV AB SERPL QL IA: REACTIVE
HOLD SPECIMEN: NORMAL
N GONORRHOEA DNA SPEC QL NAA+PROBE: NEGATIVE
T PALLIDUM AB SER QL: NONREACTIVE

## 2023-03-01 RX ORDER — CLONIDINE HYDROCHLORIDE 0.1 MG/1
0.1 TABLET ORAL 3 TIMES DAILY PRN
COMMUNITY
Start: 2023-01-17

## 2023-03-01 RX ORDER — ESCITALOPRAM OXALATE 20 MG/1
20 TABLET ORAL DAILY
COMMUNITY

## 2023-03-01 RX ORDER — ALBUTEROL SULFATE 90 UG/1
2 AEROSOL, METERED RESPIRATORY (INHALATION) EVERY 4 HOURS PRN
COMMUNITY
Start: 2023-01-17

## 2023-03-01 RX ORDER — BUDESONIDE AND FORMOTEROL FUMARATE DIHYDRATE 80; 4.5 UG/1; UG/1
1 AEROSOL RESPIRATORY (INHALATION) 2 TIMES DAILY
COMMUNITY
Start: 2023-02-08

## 2023-03-01 RX ORDER — PRAZOSIN HYDROCHLORIDE 2 MG/1
2 CAPSULE ORAL AT BEDTIME
COMMUNITY

## 2023-03-01 RX ORDER — MIRTAZAPINE 30 MG/1
30 TABLET, FILM COATED ORAL
COMMUNITY
Start: 2023-01-17

## 2023-03-01 RX ORDER — HYDROXYZINE PAMOATE 50 MG/1
50 CAPSULE ORAL
COMMUNITY
Start: 2023-01-17

## 2023-03-01 RX ORDER — BUSPIRONE HYDROCHLORIDE 10 MG/1
10 TABLET ORAL 3 TIMES DAILY PRN
COMMUNITY

## 2023-03-01 RX ORDER — IBUPROFEN 800 MG/1
800 TABLET, FILM COATED ORAL 3 TIMES DAILY PRN
COMMUNITY
Start: 2023-01-17

## 2023-03-01 RX ORDER — ONDANSETRON 4 MG/1
4 TABLET, FILM COATED ORAL PRN
COMMUNITY
Start: 2023-01-17

## 2023-03-01 RX ORDER — GABAPENTIN 800 MG/1
800 TABLET ORAL 3 TIMES DAILY
COMMUNITY
Start: 2023-01-17

## 2023-03-01 RX ORDER — BUPRENORPHINE AND NALOXONE 8; 2 MG/1; MG/1
FILM, SOLUBLE BUCCAL; SUBLINGUAL
COMMUNITY
Start: 2023-01-17

## 2023-03-02 LAB
BKR LAB AP GYN ADEQUACY: NORMAL
BKR LAB AP GYN INTERPRETATION: NORMAL
BKR LAB AP HPV REFLEX: NORMAL
BKR LAB AP LMP: NORMAL
BKR LAB AP PREVIOUS ABNORMAL: NORMAL
GAMMA INTERFERON BACKGROUND BLD IA-ACNC: 0.07 IU/ML
HCV RNA SERPL NAA+PROBE-ACNC: NOT DETECTED IU/ML
M TB IFN-G BLD-IMP: POSITIVE
M TB IFN-G CD4+ BCKGRND COR BLD-ACNC: 9.93 IU/ML
MITOGEN IGNF BCKGRD COR BLD-ACNC: 0.39 IU/ML
MITOGEN IGNF BCKGRD COR BLD-ACNC: 0.45 IU/ML
PATH REPORT.COMMENTS IMP SPEC: NORMAL
PATH REPORT.COMMENTS IMP SPEC: NORMAL
PATH REPORT.RELEVANT HX SPEC: NORMAL
QUANTIFERON MITOGEN: 10 IU/ML
QUANTIFERON NIL TUBE: 0.07 IU/ML
QUANTIFERON TB1 TUBE: 0.46 IU/ML
QUANTIFERON TB2 TUBE: 0.52

## 2023-03-02 NOTE — RESULT ENCOUNTER NOTE
Sent message to Myla at [facility] to get the patient scheduled for a results follow up and a chest xray scheduled.     Keke Marcus CMA

## 2023-03-03 LAB
HUMAN PAPILLOMA VIRUS 16 DNA: NEGATIVE
HUMAN PAPILLOMA VIRUS 18 DNA: NEGATIVE
HUMAN PAPILLOMA VIRUS FINAL DIAGNOSIS: NORMAL
HUMAN PAPILLOMA VIRUS OTHER HR: NEGATIVE

## 2023-03-06 ENCOUNTER — TELEPHONE (OUTPATIENT)
Dept: FAMILY MEDICINE | Facility: CLINIC | Age: 38
End: 2023-03-06

## 2023-03-06 NOTE — TELEPHONE ENCOUNTER
Nut[facility] Education Scheduling Outreach #1:    Call to patient to schedule. Left message with phone number to call to schedule.    Plan for 2nd outreach attempt within 2 business days.    Madonna Masters [facility]  Diabetes and Nutrition Scheduling

## 2023-03-08 ENCOUNTER — TELEPHONE (OUTPATIENT)
Dept: FAMILY MEDICINE | Facility: CLINIC | Age: 38
End: 2023-03-08

## 2023-03-08 ENCOUNTER — ANCILLARY PROCEDURE (OUTPATIENT)
Dept: GENERAL RADIOLOGY | Facility: CLINIC | Age: 38
End: 2023-03-08
Attending: NURSE PRACTITIONER
Payer: COMMERCIAL

## 2023-03-08 DIAGNOSIS — R76.12 POSITIVE QUANTIFERON-TB GOLD TEST: ICD-10-CM

## 2023-03-08 PROCEDURE — 71046 X-RAY EXAM CHEST 2 VIEWS: CPT | Performed by: RADIOLOGY

## 2023-03-08 NOTE — TELEPHONE ENCOUNTER
Nutrition Education Scheduling Outreach #2:    Call to patient to schedule. Left message with phone number to call to schedule.    Madonna Arriaza  [place] [facility]  Diabetes and Nutrition Scheduling

## 2023-03-08 NOTE — TELEPHONE ENCOUNTER
Madonna, a nurse at [facility], reached out to let us know that the patient refused to go to their chest xray appointment. I am still attempting to get them set up for a follow up visit.     Keke Marcus, CMA

## 2023-03-28 ENCOUNTER — OFFICE VISIT (OUTPATIENT)
Dept: FAMILY MEDICINE | Facility: CLINIC | Age: 38
End: 2023-03-28

## 2023-03-28 VITALS
HEIGHT: 65 IN | WEIGHT: 258 LBS | DIASTOLIC BLOOD PRESSURE: 80 MMHG | OXYGEN SATURATION: 95 % | BODY MASS INDEX: 42.99 KG/M2 | HEART RATE: 68 BPM | SYSTOLIC BLOOD PRESSURE: 127 MMHG | TEMPERATURE: 98 F

## 2023-03-28 DIAGNOSIS — E66.813 CLASS 3 SEVERE OBESITY WITHOUT SERIOUS COMORBIDITY WITH BODY MASS INDEX (BMI) OF 40.0 TO 44.9 IN ADULT, UNSPECIFIED OBESITY TYPE (H): Primary | ICD-10-CM

## 2023-03-28 DIAGNOSIS — E66.01 CLASS 3 SEVERE OBESITY WITHOUT SERIOUS COMORBIDITY WITH BODY MASS INDEX (BMI) OF 40.0 TO 44.9 IN ADULT, UNSPECIFIED OBESITY TYPE (H): Primary | ICD-10-CM

## 2023-03-28 DIAGNOSIS — Z79.899 MEDICATION MANAGEMENT: ICD-10-CM

## 2023-03-28 DIAGNOSIS — R73.03 PREDIABETES: ICD-10-CM

## 2023-03-28 DIAGNOSIS — Z23 VACCINE FOR VIRAL HEPATITIS: ICD-10-CM

## 2023-03-28 DIAGNOSIS — R60.0 PERIPHERAL EDEMA: ICD-10-CM

## 2023-03-28 DIAGNOSIS — R10.2 PELVIC PAIN IN FEMALE: ICD-10-CM

## 2023-03-28 DIAGNOSIS — N91.2 AMENORRHEA: ICD-10-CM

## 2023-03-28 DIAGNOSIS — Z23 NEED FOR TDAP VACCINATION: ICD-10-CM

## 2023-03-28 LAB
ALBUMIN SERPL BCG-MCNC: 4.2 G/DL (ref 3.5–5.2)
CHOLEST SERPL-MCNC: 186 MG/DL
FSH SERPL IRP2-ACNC: 5 MIU/ML
HDLC SERPL-MCNC: 58 MG/DL
LDLC SERPL CALC-MCNC: 110 MG/DL
LH SERPL-ACNC: <0.3 MIU/ML
NONHDLC SERPL-MCNC: 128 MG/DL
POTASSIUM SERPL-SCNC: 4.3 MMOL/L (ref 3.4–5.3)
PROLACTIN SERPL 3RD IS-MCNC: 34 NG/ML (ref 5–23)
TRIGL SERPL-MCNC: 91 MG/DL

## 2023-03-28 PROCEDURE — 83002 ASSAY OF GONADOTROPIN (LH): CPT | Mod: ORL | Performed by: NURSE PRACTITIONER

## 2023-03-28 PROCEDURE — 83001 ASSAY OF GONADOTROPIN (FSH): CPT | Mod: ORL | Performed by: NURSE PRACTITIONER

## 2023-03-28 PROCEDURE — 84403 ASSAY OF TOTAL TESTOSTERONE: CPT | Mod: ORL | Performed by: NURSE PRACTITIONER

## 2023-03-28 PROCEDURE — 82040 ASSAY OF SERUM ALBUMIN: CPT | Mod: ORL | Performed by: NURSE PRACTITIONER

## 2023-03-28 PROCEDURE — 84146 ASSAY OF PROLACTIN: CPT | Mod: ORL | Performed by: NURSE PRACTITIONER

## 2023-03-28 PROCEDURE — 84132 ASSAY OF SERUM POTASSIUM: CPT | Mod: ORL | Performed by: NURSE PRACTITIONER

## 2023-03-28 PROCEDURE — 80061 LIPID PANEL: CPT | Mod: ORL | Performed by: NURSE PRACTITIONER

## 2023-03-28 PROCEDURE — 84270 ASSAY OF SEX HORMONE GLOBUL: CPT | Mod: ORL | Performed by: NURSE PRACTITIONER

## 2023-03-28 RX ORDER — FUROSEMIDE 20 MG
20 TABLET ORAL DAILY
Qty: 2 TABLET | Refills: 0 | Status: SHIPPED | OUTPATIENT
Start: 2023-03-28

## 2023-03-28 ASSESSMENT — ANXIETY QUESTIONNAIRES
6. BECOMING EASILY ANNOYED OR IRRITABLE: NEARLY EVERY DAY
7. FEELING AFRAID AS IF SOMETHING AWFUL MIGHT HAPPEN: SEVERAL DAYS
GAD7 TOTAL SCORE: 14
5. BEING SO RESTLESS THAT IT IS HARD TO SIT STILL: NEARLY EVERY DAY
1. FEELING NERVOUS, ANXIOUS, OR ON EDGE: NEARLY EVERY DAY
2. NOT BEING ABLE TO STOP OR CONTROL WORRYING: SEVERAL DAYS
GAD7 TOTAL SCORE: 14
3. WORRYING TOO MUCH ABOUT DIFFERENT THINGS: SEVERAL DAYS

## 2023-03-28 ASSESSMENT — PATIENT HEALTH QUESTIONNAIRE - PHQ9
SUM OF ALL RESPONSES TO PHQ QUESTIONS 1-9: 10
5. POOR APPETITE OR OVEREATING: MORE THAN HALF THE DAYS

## 2023-03-28 NOTE — PATIENT INSTRUCTIONS
Follow-up Exam:  -TDAP was recommended but not administered due to latex allergy.    Weight Gain/Peripheral edema:  -Labs:Albumin, Potassium  -Furosemide (Lasix) 20mg: two doses. Take once and then again in two days.   -Obtain compression stockings; elevate legs at rest    Amenorrhea (Lack of menses)  -Follow-up for pelvic ultrasound  -Labs: FSH, LH, Prolactin, Testosterone    Prediabetes:   -Metformin (take 1 tablet of 500mg daily for one week; then increase to twice daily)  -Follow up with weight/medication management team  -Continue efforts for dietary changes as able    Depression/Anxiety  -Recommend therapy/counseling sessions and notify provider if referral is needed for outside source    Please follow-up after pelvic ultrasound (preferably 1 week)

## 2023-03-28 NOTE — NURSING NOTE
[facility]    Preferred Name: Alexandrea     How did you hear about us?  Other - [facility]    37 year old  Chief Complaint   Patient presents with     RECHECK       Blood pressure 127/80, pulse 68, temperature 98  F (36.7  C), temperature source Oral, height 1.651 m (5' 5\"), weight 117 kg (258 lb), last menstrual period 2022, SpO2 95 %. Body mass index is 42.93 kg/m .  BP completed using cuff size:        Patient Active Problem List   Diagnosis     CARDIOVASCULAR SCREENING; LDL GOAL LESS THAN 160     Heroin dependence (H)     Morbid obesity (H)     Peripheral edema     Weight gain     Hepatitis C, chronic (H)       Wt Readings from Last 2 Encounters:   23 117 kg (258 lb)   23 112.9 kg (249 lb)     BP Readings from Last 3 Encounters:   23 127/80   23 109/74   22 103/67       Allergies   Allergen Reactions     Latex      Rash     Shellfish-Derived Products        Current Outpatient Medications   Medication     albuterol (PROAIR HFA/PROVENTIL HFA/VENTOLIN HFA) 108 (90 Base) MCG/ACT inhaler     budesonide-formoterol (SYMBICORT) 80-4.5 MCG/ACT Inhaler     buprenorphine HCl-naloxone HCl (SUBOXONE) 8-2 MG per film     busPIRone (BUSPAR) 10 MG tablet     cholecalciferol 50 MCG (2000 UT) CAPS     cloNIDine (CATAPRES) 0.1 MG tablet     escitalopram (LEXAPRO) 20 MG tablet     gabapentin (NEURONTIN) 800 MG tablet     hydrOXYzine (VISTARIL) 50 MG capsule     ibuprofen (ADVIL/MOTRIN) 800 MG tablet     mirtazapine (REMERON) 30 MG tablet     NO ACTIVE MEDICATIONS     ondansetron (ZOFRAN) 4 MG tablet     prazosin (MINIPRESS) 2 MG capsule     No current facility-administered medications for this visit.       Social History     Tobacco Use     Smoking status: Former     Years: 25.00     Types: Cigarettes     Quit date: 2022     Years since quittin.9     Tobacco comments:      PPD   Vaping Use     Vaping Use: Former   Substance Use Topics     Alcohol use: Not Currently     Drug " "use: Not Currently     Types: Fentanyl, Methamphetamines, \"Crack\" cocaine     Comment: 12/5/22 heroin, fentanyl       Honoring Choices - Health Care Directive Guide offered to patient at time of visit.    Health Maintenance Due   Topic Date Due     ADVANCE CARE PLANNING  Never done     COVID-19 Vaccine (1) Never done     Pneumococcal Vaccine: Pediatrics (0 to 5 Years) and At-Risk Patients (6 to 64 Years) (1 - PCV) Never done     DTAP/TDAP/TD IMMUNIZATION (2 - Td or Tdap) 06/26/2022     INFLUENZA VACCINE (1) Never done       There is no immunization history for the selected administration types on file for this patient.    No results found for: PAP    Recent Labs   Lab Test 02/28/23  1722 02/28/23  1719 09/08/18  1102   A1C 6.2*  --   --    ALT  --   --  28   CR  --  0.96* 0.89   GFRESTIMATED  --  78 73   GFRESTBLACK  --   --  89   ALBUMIN  --   --  2.9*   POTASSIUM  --   --  4.2   TSH  --  3.70 1.93       No flowsheet data found.    PHQ-9 SCORE 2/28/2023   PHQ-9 Total Score 16       SAMIA-7 SCORE 2/28/2023   Total Score 15       No flowsheet data found.    Bob Owens    March 28, 2023 2:29 PM    "

## 2023-03-28 NOTE — PROGRESS NOTES
[facility] FOLLOW UP VISIT    Patient: Alexandrea Linton YOB: 1985    Date of Exam: 3/28/23    Arrival Time: 02 16 PM  Departure Time: Unsure. 4:19PM    Charge Phone (written on paperwork): [phone]    Please be advised that this client resides in a facility in which narcotic medications are not permitted. If pain management is needed, please prescribe an alternative medication.     Alexandrea Linton is a 37 year old who presents for a follow-up visit after a physical exam on 23. Has the following concerns:    1.Amenorrhea. Alexandrea reported amenorrhea with LMP in 2022. She reports having exceptionally regular menses prior. A bimanual exam was not performed at her last assessment and will be assessed today.    2. Wt gain and peripheral edema. Alexandrea  reported a 30-50lb weight gain since last December with +1 pitting edema to lower extremities the past couple of months. She had received an order for compression stockings but has not yet been provided these at her treatment facility. She is tearful and reports frustration with a lack of dietary options provided to her at [facility]. She has not responded to the outreach from [facility] [employer] as she feels it would not be beneficial to her given her lack of dietary options. She is given time allotments for exercise, but due to the pain from her excessive swelling in her lower extremities, is unable to participate. Notes she is having difficulty getting her feet in to her regular shoes due to swelling. Chart review: history of hypoalbumiunuria (borderline). eGFR 73ml/dL. Cr. 0.96    3. Depression and anxiety. Alexandrea had a close friend die recently and has had some increased anxiety after the  this past week. She denies thoughts of harm to herself or to others. She has not yet started counseling and is interested in doing this.     4. Lab follow up. Reviewed lab results from previous appointment. No immunity  "to hepatitis A. Tdap ordered at last visit but clinic out so was not given. A1c 6.2: PreDM. History low albumin. TSH WNL.     Do you need any refills on your Medications today? No    Review Of Systems  Respiratory: No shortness of breath, dyspnea on exertion, cough, or hemoptysis  Cardiovascular: negative for palpitations and chest pain. Peripheral edema as per HPI.   Neurologic: negative for numbness or tingling of hands or feet  Psychiatric: negative for thoughts of self-harm and thoughts of hurting someone else. Positive for increased stressors. Fatigued.   : positive for generalized pelvic cramping. Concern re: amenorrhea.     PHQ 3/28/2023   PHQ-9 Total Score 10   Q9: Thoughts of better off dead/self-harm past 2 weeks Not at all     SAMIA-7 SCORE 2/28/2023 3/28/2023   Total Score 15 14     General Physical Exam:  Vitals: LMP 11/18/2022    /80   Pulse 68   Temp 98  F (36.7  C) (Oral)   Ht 1.651 m (5' 5\")   Wt 117 kg (258 lb)   LMP 11/18/2022   SpO2 95%   BMI 42.93 kg/m      Exam:  Constitutional: healthy, alert and cooperative.Tearful at times.  Cardiovascular: HRRR. S1, S2. No MRG. No lifts, heaves, or thrills. +1 pitting edema to lower extremities to proximal tibia bilaterally. R >L  Respiratory: Good diaphragmatic excursion. Lungs clear bilaterally  : Normal external genitalia without lesions  Pelvic exam: normal vagina and vulva, uterus normal size anteverted. No abnormalities palpable on bimanual exam; patient reports generalized discomfort over pelvic area upon pressure application.   Musculoskeletal: extremities normal- no gross deformities noted, gait normal and normal muscle tone  Psychiatric: mentation appears normal,appears anxious and crying at times.         Assessment / Plan:    Medication changes made at today's visit:     MEDICATIONS:   Orders Placed This Encounter   Medications     furosemide (LASIX) 20 MG tablet     Sig: Take 1 tablet (20 mg) by mouth daily Take one tablet (20mg) " and repeat in 2 days     Dispense:  2 tablet     Refill:  0     metFORMIN (GLUCOPHAGE) 500 MG tablet     Sig: Take 1 tablet (500 mg) by mouth 2 times daily (with meals) Take 1 tablet daily (500mg) x 1 week then increase to 1 tablet twice daily     Dispense:  60 tablet     Refill:  1    - Continue other medications without change    (E66.01,  Z68.41) Class 3 severe obesity without serious comorbidity with body mass index (BMI) of 40.0 to 44.9 in adult, unspecified obesity type (H)  (primary encounter diagnosis)  Comment: Follow up with Weight management services to discuss potential medicinal management. Continue attempting to eat healthy foods such as fruits and vegetables as able with planned meals at [facility]. Exercise as tolerated. Metformin initiated which may help with weight and potential PCOS.   Plan: Lipid panel reflex to direct LDL Fasting, Adult        Comprehensive Weight Management          Referral    (R73.03) Prediabetes  Comment: Hgb A1c 6.2. Positive family history. Start 1 tablet of Metformin once a day for one week, then increase to two tablets daily. Reviewed use and SE. Follow up 1-2 weeks.   Plan: metFORMIN (GLUCOPHAGE) 500 MG tablet    (Z23) Need for Tdap vaccination  Comment: Last vaccine in 2012  Plan: Unable to administer today due to Latex allergy/contraindication    (Z23) Vaccine for viral hepatitis  Comment: Non-immune to Hepatitis A  Plan: Declined vaccination today.     (R60.9) Peripheral edema  Comment: +1 pitting edema to lower extremeties. Request [facility] staff obtain compression stockings a previously ordered. Elevate legs at rest. Will try furosemide x2 to stimulate fluid mobilization.  Reviewed high potassium foods. Check potassium today and plan recheck at next encounter.   Plan: Albumin level, furosemide (LASIX) 20 MG tablet        Take 1 dose 3/29 and additional dose 3/31.    (N91.2) Amenorrhea  Comment: LMP November 2022 (previously regular in timing and duration). Consider  hormonal imbalance, PCOS given obesity. Obtain PUS to rule out structural abnormality and to evaluate pelvic pain.   Plan: Prolactin, Follicle stimulating hormone,         Luteinizing Hormone, Testosterone Free and         Total, US Pelvic Complete with Transvaginal    (Z79.899) Medication management  Comment: Due to diuretic use. As above.   Plan: Potassium    (R10.2) Pelvic pain in female  Comment: No abnormalities noted on bimanual exam. Patient reports generalized pelvic discomfort and cramping. Amenorrhea since November 2022   Plan: US Pelvic Complete with Transvaginal    Recommended Hepatitis A vaccine today given non-immunity status; patient declines.  TDAP also recommend. Patient reports a history of latex allergy,  Vaccine formulation has latex in it, so  contraindicated.     Referrals Made:   Referral to Weight management    If you had an XRay/CT/Ultrasound/MRI ordered the number is [phone] to schedule or change your radiology appointment.     Additional Comments:Patient to see counseling services at [facility] related to her increased anxiety. Please notify if she needs external referral.  Please obtain compression stockings for peripheral edema. Ordered at last encounter.     Please make a follow-up appointment with me, Zita Regalado, in 1 week to discuss ultrasound and lab results.    Traci Rogers, RNC-OB, BSN  Student Nurse Midwife  [facility]    Zita Regalado, ALYSSIA CNP

## 2023-03-29 LAB — SHBG SERPL-SCNC: 41 NMOL/L (ref 30–135)

## 2023-03-30 LAB
TESTOST FREE SERPL-MCNC: 0.05 NG/DL
TESTOST SERPL-MCNC: 3 NG/DL (ref 8–60)